# Patient Record
Sex: MALE | URBAN - METROPOLITAN AREA
[De-identification: names, ages, dates, MRNs, and addresses within clinical notes are randomized per-mention and may not be internally consistent; named-entity substitution may affect disease eponyms.]

---

## 2024-11-16 ENCOUNTER — APPOINTMENT (OUTPATIENT)
Dept: CARDIOLOGY | Facility: MEDICAL CENTER | Age: 74
DRG: 322 | End: 2024-11-16
Attending: INTERNAL MEDICINE
Payer: MEDICARE

## 2024-11-16 ENCOUNTER — HOSPITAL ENCOUNTER (OUTPATIENT)
Dept: RADIOLOGY | Facility: MEDICAL CENTER | Age: 74
End: 2024-11-16
Payer: MEDICARE

## 2024-11-16 ENCOUNTER — HOSPITAL ENCOUNTER (INPATIENT)
Facility: MEDICAL CENTER | Age: 74
LOS: 4 days | DRG: 322 | End: 2024-11-20
Attending: STUDENT IN AN ORGANIZED HEALTH CARE EDUCATION/TRAINING PROGRAM | Admitting: INTERNAL MEDICINE
Payer: MEDICARE

## 2024-11-16 ENCOUNTER — APPOINTMENT (OUTPATIENT)
Dept: RADIOLOGY | Facility: MEDICAL CENTER | Age: 74
DRG: 322 | End: 2024-11-16
Payer: MEDICARE

## 2024-11-16 DIAGNOSIS — I25.83 CORONARY ARTERY DISEASE DUE TO LIPID RICH PLAQUE: ICD-10-CM

## 2024-11-16 DIAGNOSIS — I25.10 CORONARY ARTERY DISEASE DUE TO LIPID RICH PLAQUE: ICD-10-CM

## 2024-11-16 DIAGNOSIS — I21.11 ST ELEVATION MYOCARDIAL INFARCTION INVOLVING RIGHT CORONARY ARTERY (HCC): ICD-10-CM

## 2024-11-16 DIAGNOSIS — N17.9 AKI (ACUTE KIDNEY INJURY) (HCC): ICD-10-CM

## 2024-11-16 PROBLEM — E11.9 NON-INSULIN DEPENDENT TYPE 2 DIABETES MELLITUS (HCC): Status: ACTIVE | Noted: 2024-11-16

## 2024-11-16 PROBLEM — R25.1 TREMOR: Status: ACTIVE | Noted: 2024-11-16

## 2024-11-16 PROBLEM — I10 HYPERTENSION: Status: ACTIVE | Noted: 2024-11-16

## 2024-11-16 LAB
BASOPHILS # BLD AUTO: 0.2 % (ref 0–1.8)
BASOPHILS # BLD: 0.04 K/UL (ref 0–0.12)
EKG IMPRESSION: NORMAL
EOSINOPHIL # BLD AUTO: 0.01 K/UL (ref 0–0.51)
EOSINOPHIL NFR BLD: 0.1 % (ref 0–6.9)
ERYTHROCYTE [DISTWIDTH] IN BLOOD BY AUTOMATED COUNT: 50.5 FL (ref 35.9–50)
HCT VFR BLD AUTO: 38.6 % (ref 42–52)
HGB BLD-MCNC: 12.5 G/DL (ref 14–18)
HOLDING TUBE BB 8507: NORMAL
IMM GRANULOCYTES # BLD AUTO: 0.09 K/UL (ref 0–0.11)
IMM GRANULOCYTES NFR BLD AUTO: 0.5 % (ref 0–0.9)
LYMPHOCYTES # BLD AUTO: 0.69 K/UL (ref 1–4.8)
LYMPHOCYTES NFR BLD: 4.2 % (ref 22–41)
MCH RBC QN AUTO: 31.4 PG (ref 27–33)
MCHC RBC AUTO-ENTMCNC: 32.4 G/DL (ref 32.3–36.5)
MCV RBC AUTO: 97 FL (ref 81.4–97.8)
MONOCYTES # BLD AUTO: 1.54 K/UL (ref 0–0.85)
MONOCYTES NFR BLD AUTO: 9.3 % (ref 0–13.4)
NEUTROPHILS # BLD AUTO: 14.19 K/UL (ref 1.82–7.42)
NEUTROPHILS NFR BLD: 85.7 % (ref 44–72)
NRBC # BLD AUTO: 0 K/UL
NRBC BLD-RTO: 0 /100 WBC (ref 0–0.2)
PLATELET # BLD AUTO: 261 K/UL (ref 164–446)
PMV BLD AUTO: 11.4 FL (ref 9–12.9)
RBC # BLD AUTO: 3.98 M/UL (ref 4.7–6.1)
WBC # BLD AUTO: 16.6 K/UL (ref 4.8–10.8)

## 2024-11-16 PROCEDURE — 36415 COLL VENOUS BLD VENIPUNCTURE: CPT

## 2024-11-16 PROCEDURE — 96365 THER/PROPH/DIAG IV INF INIT: CPT

## 2024-11-16 PROCEDURE — 99153 MOD SED SAME PHYS/QHP EA: CPT

## 2024-11-16 PROCEDURE — 99152 MOD SED SAME PHYS/QHP 5/>YRS: CPT | Performed by: INTERNAL MEDICINE

## 2024-11-16 PROCEDURE — 027034Z DILATION OF CORONARY ARTERY, ONE ARTERY WITH DRUG-ELUTING INTRALUMINAL DEVICE, PERCUTANEOUS APPROACH: ICD-10-PCS | Performed by: INTERNAL MEDICINE

## 2024-11-16 PROCEDURE — 700102 HCHG RX REV CODE 250 W/ 637 OVERRIDE(OP)

## 2024-11-16 PROCEDURE — 93005 ELECTROCARDIOGRAM TRACING: CPT | Performed by: INTERNAL MEDICINE

## 2024-11-16 PROCEDURE — 71045 X-RAY EXAM CHEST 1 VIEW: CPT

## 2024-11-16 PROCEDURE — 700111 HCHG RX REV CODE 636 W/ 250 OVERRIDE (IP)

## 2024-11-16 PROCEDURE — 99291 CRITICAL CARE FIRST HOUR: CPT | Mod: GC | Performed by: INTERNAL MEDICINE

## 2024-11-16 PROCEDURE — 93458 L HRT ARTERY/VENTRICLE ANGIO: CPT | Mod: 59 | Performed by: INTERNAL MEDICINE

## 2024-11-16 PROCEDURE — 770022 HCHG ROOM/CARE - ICU (200)

## 2024-11-16 PROCEDURE — 700101 HCHG RX REV CODE 250

## 2024-11-16 PROCEDURE — A9270 NON-COVERED ITEM OR SERVICE: HCPCS

## 2024-11-16 PROCEDURE — 99291 CRITICAL CARE FIRST HOUR: CPT

## 2024-11-16 PROCEDURE — 85025 COMPLETE CBC W/AUTO DIFF WBC: CPT

## 2024-11-16 PROCEDURE — 92941 PRQ TRLML REVSC TOT OCCL AMI: CPT | Performed by: INTERNAL MEDICINE

## 2024-11-16 PROCEDURE — 99222 1ST HOSP IP/OBS MODERATE 55: CPT | Performed by: INTERNAL MEDICINE

## 2024-11-16 PROCEDURE — B211YZZ FLUOROSCOPY OF MULTIPLE CORONARY ARTERIES USING OTHER CONTRAST: ICD-10-PCS | Performed by: INTERNAL MEDICINE

## 2024-11-16 PROCEDURE — 93005 ELECTROCARDIOGRAM TRACING: CPT | Performed by: STUDENT IN AN ORGANIZED HEALTH CARE EDUCATION/TRAINING PROGRAM

## 2024-11-16 PROCEDURE — 4A023N7 MEASUREMENT OF CARDIAC SAMPLING AND PRESSURE, LEFT HEART, PERCUTANEOUS APPROACH: ICD-10-PCS | Performed by: INTERNAL MEDICINE

## 2024-11-16 RX ORDER — ASPIRIN 81 MG/1
81 TABLET ORAL DAILY
Status: DISCONTINUED | OUTPATIENT
Start: 2024-11-17 | End: 2024-11-20 | Stop reason: HOSPADM

## 2024-11-16 RX ORDER — BIVALIRUDIN 250 MG/5ML
INJECTION, POWDER, LYOPHILIZED, FOR SOLUTION INTRAVENOUS
Status: COMPLETED
Start: 2024-11-16 | End: 2024-11-16

## 2024-11-16 RX ORDER — SODIUM CHLORIDE 9 MG/ML
3 INJECTION, SOLUTION INTRAVENOUS CONTINUOUS
Status: ACTIVE | OUTPATIENT
Start: 2024-11-16 | End: 2024-11-17

## 2024-11-16 RX ORDER — MIDAZOLAM HYDROCHLORIDE 1 MG/ML
INJECTION INTRAMUSCULAR; INTRAVENOUS
Status: COMPLETED
Start: 2024-11-16 | End: 2024-11-16

## 2024-11-16 RX ORDER — HEPARIN SODIUM 1000 [USP'U]/ML
INJECTION, SOLUTION INTRAVENOUS; SUBCUTANEOUS
Status: COMPLETED
Start: 2024-11-16 | End: 2024-11-16

## 2024-11-16 RX ORDER — LIDOCAINE HYDROCHLORIDE 20 MG/ML
INJECTION, SOLUTION INFILTRATION; PERINEURAL
Status: COMPLETED
Start: 2024-11-16 | End: 2024-11-16

## 2024-11-16 RX ORDER — HEPARIN SODIUM 200 [USP'U]/100ML
INJECTION, SOLUTION INTRAVENOUS
Status: COMPLETED
Start: 2024-11-16 | End: 2024-11-16

## 2024-11-16 RX ORDER — CLOPIDOGREL BISULFATE 75 MG/1
75 TABLET ORAL DAILY
Status: DISCONTINUED | OUTPATIENT
Start: 2024-11-17 | End: 2024-11-20 | Stop reason: HOSPADM

## 2024-11-16 RX ORDER — HEPARIN SODIUM 5000 [USP'U]/100ML
INJECTION, SOLUTION INTRAVENOUS
Status: COMPLETED
Start: 2024-11-16 | End: 2024-11-16

## 2024-11-16 RX ORDER — VERAPAMIL HYDROCHLORIDE 2.5 MG/ML
INJECTION, SOLUTION INTRAVENOUS
Status: COMPLETED
Start: 2024-11-16 | End: 2024-11-16

## 2024-11-16 RX ORDER — ACETAMINOPHEN 325 MG/1
650 TABLET ORAL EVERY 6 HOURS PRN
Status: DISCONTINUED | OUTPATIENT
Start: 2024-11-16 | End: 2024-11-20 | Stop reason: HOSPADM

## 2024-11-16 RX ORDER — HEPARIN SODIUM 5000 [USP'U]/100ML
0-30 INJECTION, SOLUTION INTRAVENOUS CONTINUOUS
Status: DISCONTINUED | OUTPATIENT
Start: 2024-11-16 | End: 2024-11-16

## 2024-11-16 RX ORDER — CLOPIDOGREL 300 MG/1
TABLET, FILM COATED ORAL
Status: COMPLETED
Start: 2024-11-16 | End: 2024-11-16

## 2024-11-16 RX ORDER — HEPARIN SODIUM 1000 [USP'U]/ML
30 INJECTION, SOLUTION INTRAVENOUS; SUBCUTANEOUS PRN
Status: DISCONTINUED | OUTPATIENT
Start: 2024-11-16 | End: 2024-11-16

## 2024-11-16 RX ORDER — CLOPIDOGREL BISULFATE 75 MG/1
300 TABLET ORAL ONCE
Status: DISCONTINUED | OUTPATIENT
Start: 2024-11-16 | End: 2024-11-16

## 2024-11-16 RX ADMIN — HEPARIN SODIUM 12 UNITS/KG/HR: 5000 INJECTION, SOLUTION INTRAVENOUS at 21:45

## 2024-11-16 RX ADMIN — NITROGLYCERIN 10 ML: 20 INJECTION INTRAVENOUS at 21:57

## 2024-11-16 RX ADMIN — CLOPIDOGREL BISULFATE 300 MG: 300 TABLET, FILM COATED ORAL at 22:34

## 2024-11-16 RX ADMIN — HEPARIN SODIUM: 1000 INJECTION, SOLUTION INTRAVENOUS; SUBCUTANEOUS at 21:54

## 2024-11-16 RX ADMIN — BIVALIRUDIN 1.75 MG/KG/HR: 250 INJECTION, POWDER, LYOPHILIZED, FOR SOLUTION INTRAVENOUS at 22:07

## 2024-11-16 RX ADMIN — VERAPAMIL HYDROCHLORIDE 2.5 MG: 2.5 INJECTION, SOLUTION INTRAVENOUS at 21:56

## 2024-11-16 RX ADMIN — BIVALIRUDIN 0.2 MG/KG/HR: 250 INJECTION, POWDER, LYOPHILIZED, FOR SOLUTION INTRAVENOUS at 22:40

## 2024-11-16 RX ADMIN — MIDAZOLAM HYDROCHLORIDE 1 MG: 1 INJECTION, SOLUTION INTRAMUSCULAR; INTRAVENOUS at 22:27

## 2024-11-16 RX ADMIN — LIDOCAINE HYDROCHLORIDE: 20 INJECTION, SOLUTION INFILTRATION; PERINEURAL at 21:56

## 2024-11-16 RX ADMIN — HEPARIN SODIUM 2000 UNITS: 200 INJECTION, SOLUTION INTRAVENOUS at 21:57

## 2024-11-16 RX ADMIN — FENTANYL CITRATE 50 MCG: 50 INJECTION, SOLUTION INTRAMUSCULAR; INTRAVENOUS at 22:27

## 2024-11-16 SDOH — ECONOMIC STABILITY: TRANSPORTATION INSECURITY
IN THE PAST 12 MONTHS, HAS LACK OF RELIABLE TRANSPORTATION KEPT YOU FROM MEDICAL APPOINTMENTS, MEETINGS, WORK OR FROM GETTING THINGS NEEDED FOR DAILY LIVING?: NO

## 2024-11-16 SDOH — ECONOMIC STABILITY: TRANSPORTATION INSECURITY
IN THE PAST 12 MONTHS, HAS THE LACK OF TRANSPORTATION KEPT YOU FROM MEDICAL APPOINTMENTS OR FROM GETTING MEDICATIONS?: NO

## 2024-11-16 ASSESSMENT — COGNITIVE AND FUNCTIONAL STATUS - GENERAL
SUGGESTED CMS G CODE MODIFIER DAILY ACTIVITY: CH
DAILY ACTIVITIY SCORE: 24
SUGGESTED CMS G CODE MODIFIER MOBILITY: CI
MOBILITY SCORE: 23
CLIMB 3 TO 5 STEPS WITH RAILING: A LITTLE

## 2024-11-16 ASSESSMENT — PATIENT HEALTH QUESTIONNAIRE - PHQ9
2. FEELING DOWN, DEPRESSED, IRRITABLE, OR HOPELESS: NOT AT ALL
1. LITTLE INTEREST OR PLEASURE IN DOING THINGS: NOT AT ALL
SUM OF ALL RESPONSES TO PHQ9 QUESTIONS 1 AND 2: 0

## 2024-11-16 ASSESSMENT — PAIN DESCRIPTION - PAIN TYPE: TYPE: ACUTE PAIN

## 2024-11-16 ASSESSMENT — SOCIAL DETERMINANTS OF HEALTH (SDOH)
WITHIN THE PAST 12 MONTHS, YOU WORRIED THAT YOUR FOOD WOULD RUN OUT BEFORE YOU GOT THE MONEY TO BUY MORE: SOMETIMES TRUE
IN THE PAST 12 MONTHS, HAS THE ELECTRIC, GAS, OIL, OR WATER COMPANY THREATENED TO SHUT OFF SERVICE IN YOUR HOME?: YES
WITHIN THE LAST YEAR, HAVE YOU BEEN AFRAID OF YOUR PARTNER OR EX-PARTNER?: NO
WITHIN THE LAST YEAR, HAVE TO BEEN RAPED OR FORCED TO HAVE ANY KIND OF SEXUAL ACTIVITY BY YOUR PARTNER OR EX-PARTNER?: NO
WITHIN THE LAST YEAR, HAVE YOU BEEN HUMILIATED OR EMOTIONALLY ABUSED IN OTHER WAYS BY YOUR PARTNER OR EX-PARTNER?: NO
WITHIN THE LAST YEAR, HAVE YOU BEEN KICKED, HIT, SLAPPED, OR OTHERWISE PHYSICALLY HURT BY YOUR PARTNER OR EX-PARTNER?: NO
WITHIN THE PAST 12 MONTHS, THE FOOD YOU BOUGHT JUST DIDN'T LAST AND YOU DIDN'T HAVE MONEY TO GET MORE: NEVER TRUE

## 2024-11-17 ENCOUNTER — APPOINTMENT (OUTPATIENT)
Dept: RADIOLOGY | Facility: MEDICAL CENTER | Age: 74
DRG: 322 | End: 2024-11-17
Attending: HOSPITALIST
Payer: MEDICARE

## 2024-11-17 ENCOUNTER — APPOINTMENT (OUTPATIENT)
Dept: CARDIOLOGY | Facility: MEDICAL CENTER | Age: 74
DRG: 322 | End: 2024-11-17
Attending: INTERNAL MEDICINE
Payer: MEDICARE

## 2024-11-17 PROBLEM — D72.825 BANDEMIA: Status: ACTIVE | Noted: 2024-11-17

## 2024-11-17 PROBLEM — N17.9 ACUTE KIDNEY INJURY (HCC): Status: ACTIVE | Noted: 2024-11-17

## 2024-11-17 PROBLEM — D64.9 NORMOCYTIC NORMOCHROMIC ANEMIA: Status: ACTIVE | Noted: 2024-11-17

## 2024-11-17 PROBLEM — I25.10 CORONARY ARTERY DISEASE: Status: ACTIVE | Noted: 2024-11-17

## 2024-11-17 LAB
ANION GAP SERPL CALC-SCNC: 11 MMOL/L (ref 7–16)
ANION GAP SERPL CALC-SCNC: 12 MMOL/L (ref 7–16)
APPEARANCE UR: CLEAR
BACTERIA #/AREA URNS HPF: ABNORMAL /HPF
BILIRUB UR QL STRIP.AUTO: NEGATIVE
BUN SERPL-MCNC: 35 MG/DL (ref 8–22)
BUN SERPL-MCNC: 36 MG/DL (ref 8–22)
CALCIUM SERPL-MCNC: 8.2 MG/DL (ref 8.5–10.5)
CALCIUM SERPL-MCNC: 8.2 MG/DL (ref 8.5–10.5)
CASTS URNS QL MICRO: ABNORMAL /LPF (ref 0–2)
CHLORIDE SERPL-SCNC: 106 MMOL/L (ref 96–112)
CHLORIDE SERPL-SCNC: 108 MMOL/L (ref 96–112)
CHOLEST SERPL-MCNC: 135 MG/DL (ref 100–199)
CO2 SERPL-SCNC: 19 MMOL/L (ref 20–33)
CO2 SERPL-SCNC: 19 MMOL/L (ref 20–33)
COLOR UR: YELLOW
CREAT SERPL-MCNC: 2.17 MG/DL (ref 0.5–1.4)
CREAT SERPL-MCNC: 2.23 MG/DL (ref 0.5–1.4)
EKG IMPRESSION: NORMAL
EPITHELIAL CELLS 1715: ABNORMAL /HPF (ref 0–5)
ERYTHROCYTE [DISTWIDTH] IN BLOOD BY AUTOMATED COUNT: 49.1 FL (ref 35.9–50)
EST. AVERAGE GLUCOSE BLD GHB EST-MCNC: 154 MG/DL
GFR SERPLBLD CREATININE-BSD FMLA CKD-EPI: 30 ML/MIN/1.73 M 2
GFR SERPLBLD CREATININE-BSD FMLA CKD-EPI: 31 ML/MIN/1.73 M 2
GLUCOSE BLD STRIP.AUTO-MCNC: 116 MG/DL (ref 65–99)
GLUCOSE BLD STRIP.AUTO-MCNC: 178 MG/DL (ref 65–99)
GLUCOSE BLD STRIP.AUTO-MCNC: 187 MG/DL (ref 65–99)
GLUCOSE BLD STRIP.AUTO-MCNC: 216 MG/DL (ref 65–99)
GLUCOSE SERPL-MCNC: 185 MG/DL (ref 65–99)
GLUCOSE SERPL-MCNC: 198 MG/DL (ref 65–99)
GLUCOSE UR STRIP.AUTO-MCNC: NEGATIVE MG/DL
HBA1C MFR BLD: 7 % (ref 4–5.6)
HCT VFR BLD AUTO: 36.5 % (ref 42–52)
HDLC SERPL-MCNC: 40 MG/DL
HGB BLD-MCNC: 11.7 G/DL (ref 14–18)
KETONES UR STRIP.AUTO-MCNC: NEGATIVE MG/DL
LDLC SERPL CALC-MCNC: 80 MG/DL
LEUKOCYTE ESTERASE UR QL STRIP.AUTO: ABNORMAL
LV EJECT FRACT  99904: 55
MAGNESIUM SERPL-MCNC: 2 MG/DL (ref 1.5–2.5)
MCH RBC QN AUTO: 31.1 PG (ref 27–33)
MCHC RBC AUTO-ENTMCNC: 32.1 G/DL (ref 32.3–36.5)
MCV RBC AUTO: 97.1 FL (ref 81.4–97.8)
MICRO URNS: ABNORMAL
NITRITE UR QL STRIP.AUTO: NEGATIVE
PH UR STRIP.AUTO: 5 [PH] (ref 5–8)
PHOSPHATE SERPL-MCNC: 3.9 MG/DL (ref 2.5–4.5)
PLATELET # BLD AUTO: 178 K/UL (ref 164–446)
PMV BLD AUTO: 10.1 FL (ref 9–12.9)
POTASSIUM SERPL-SCNC: 4.4 MMOL/L (ref 3.6–5.5)
POTASSIUM SERPL-SCNC: 4.4 MMOL/L (ref 3.6–5.5)
PROT UR QL STRIP: 30 MG/DL
RBC # BLD AUTO: 3.76 M/UL (ref 4.7–6.1)
RBC # URNS HPF: ABNORMAL /HPF (ref 0–2)
RBC UR QL AUTO: NEGATIVE
SODIUM SERPL-SCNC: 137 MMOL/L (ref 135–145)
SODIUM SERPL-SCNC: 138 MMOL/L (ref 135–145)
SP GR UR STRIP.AUTO: 1.03
TRIGL SERPL-MCNC: 75 MG/DL (ref 0–149)
UROBILINOGEN UR STRIP.AUTO-MCNC: 1 EU/DL
WBC # BLD AUTO: 13 K/UL (ref 4.8–10.8)
WBC #/AREA URNS HPF: ABNORMAL /HPF

## 2024-11-17 PROCEDURE — 82962 GLUCOSE BLOOD TEST: CPT

## 2024-11-17 PROCEDURE — 80061 LIPID PANEL: CPT

## 2024-11-17 PROCEDURE — 700102 HCHG RX REV CODE 250 W/ 637 OVERRIDE(OP): Performed by: INTERNAL MEDICINE

## 2024-11-17 PROCEDURE — 99233 SBSQ HOSP IP/OBS HIGH 50: CPT | Performed by: INTERNAL MEDICINE

## 2024-11-17 PROCEDURE — 93306 TTE W/DOPPLER COMPLETE: CPT | Mod: 26 | Performed by: INTERNAL MEDICINE

## 2024-11-17 PROCEDURE — 83036 HEMOGLOBIN GLYCOSYLATED A1C: CPT

## 2024-11-17 PROCEDURE — 85027 COMPLETE CBC AUTOMATED: CPT

## 2024-11-17 PROCEDURE — 83735 ASSAY OF MAGNESIUM: CPT

## 2024-11-17 PROCEDURE — 99232 SBSQ HOSP IP/OBS MODERATE 35: CPT | Performed by: INTERNAL MEDICINE

## 2024-11-17 PROCEDURE — A9270 NON-COVERED ITEM OR SERVICE: HCPCS | Performed by: INTERNAL MEDICINE

## 2024-11-17 PROCEDURE — 99223 1ST HOSP IP/OBS HIGH 75: CPT | Performed by: HOSPITALIST

## 2024-11-17 PROCEDURE — 80048 BASIC METABOLIC PNL TOTAL CA: CPT

## 2024-11-17 PROCEDURE — 770000 HCHG ROOM/CARE - INTERMEDIATE ICU *

## 2024-11-17 PROCEDURE — 84100 ASSAY OF PHOSPHORUS: CPT

## 2024-11-17 PROCEDURE — 93306 TTE W/DOPPLER COMPLETE: CPT

## 2024-11-17 PROCEDURE — 81001 URINALYSIS AUTO W/SCOPE: CPT

## 2024-11-17 PROCEDURE — 76775 US EXAM ABDO BACK WALL LIM: CPT

## 2024-11-17 PROCEDURE — 700105 HCHG RX REV CODE 258: Performed by: INTERNAL MEDICINE

## 2024-11-17 RX ORDER — DEXTROSE MONOHYDRATE 25 G/50ML
25 INJECTION, SOLUTION INTRAVENOUS
Status: DISCONTINUED | OUTPATIENT
Start: 2024-11-17 | End: 2024-11-17

## 2024-11-17 RX ORDER — ATORVASTATIN CALCIUM 40 MG/1
40 TABLET, FILM COATED ORAL EVERY EVENING
Status: DISCONTINUED | OUTPATIENT
Start: 2024-11-17 | End: 2024-11-19

## 2024-11-17 RX ORDER — DEXTROSE MONOHYDRATE 25 G/50ML
25 INJECTION, SOLUTION INTRAVENOUS
Status: DISCONTINUED | OUTPATIENT
Start: 2024-11-17 | End: 2024-11-20 | Stop reason: HOSPADM

## 2024-11-17 RX ORDER — INSULIN LISPRO 100 [IU]/ML
2-9 INJECTION, SOLUTION INTRAVENOUS; SUBCUTANEOUS
Status: DISCONTINUED | OUTPATIENT
Start: 2024-11-17 | End: 2024-11-20 | Stop reason: HOSPADM

## 2024-11-17 RX ORDER — INSULIN LISPRO 100 [IU]/ML
1-6 INJECTION, SOLUTION INTRAVENOUS; SUBCUTANEOUS
Status: DISCONTINUED | OUTPATIENT
Start: 2024-11-17 | End: 2024-11-17

## 2024-11-17 RX ORDER — METOPROLOL TARTRATE 25 MG/1
25 TABLET, FILM COATED ORAL 2 TIMES DAILY
Status: DISCONTINUED | OUTPATIENT
Start: 2024-11-17 | End: 2024-11-17

## 2024-11-17 RX ORDER — METOPROLOL TARTRATE 25 MG/1
12.5 TABLET, FILM COATED ORAL 2 TIMES DAILY
Status: DISCONTINUED | OUTPATIENT
Start: 2024-11-17 | End: 2024-11-18

## 2024-11-17 RX ADMIN — INSULIN LISPRO 2 UNITS: 100 INJECTION, SOLUTION INTRAVENOUS; SUBCUTANEOUS at 20:45

## 2024-11-17 RX ADMIN — CLOPIDOGREL BISULFATE 75 MG: 75 TABLET ORAL at 05:05

## 2024-11-17 RX ADMIN — ASPIRIN 81 MG: 81 TABLET, COATED ORAL at 05:04

## 2024-11-17 RX ADMIN — SODIUM CHLORIDE 3 ML/KG/HR: 9 INJECTION, SOLUTION INTRAVENOUS at 02:42

## 2024-11-17 RX ADMIN — ATORVASTATIN CALCIUM 40 MG: 40 TABLET, FILM COATED ORAL at 17:24

## 2024-11-17 RX ADMIN — ACETAMINOPHEN 650 MG: 325 TABLET ORAL at 18:33

## 2024-11-17 RX ADMIN — METOPROLOL TARTRATE 12.5 MG: 25 TABLET, FILM COATED ORAL at 17:24

## 2024-11-17 RX ADMIN — INSULIN LISPRO 3 UNITS: 100 INJECTION, SOLUTION INTRAVENOUS; SUBCUTANEOUS at 12:37

## 2024-11-17 ASSESSMENT — ENCOUNTER SYMPTOMS
EYE REDNESS: 0
BACK PAIN: 0
CHILLS: 0
NAUSEA: 0
HEARTBURN: 0
FLANK PAIN: 0
ORTHOPNEA: 0
BLURRED VISION: 0
WHEEZING: 0
FOCAL WEAKNESS: 0
CONSTITUTIONAL NEGATIVE: 1
PALPITATIONS: 0
NERVOUS/ANXIOUS: 0
MYALGIAS: 0
TREMORS: 1
TINGLING: 0
SEIZURES: 0
PHOTOPHOBIA: 0
MUSCULOSKELETAL NEGATIVE: 1
SINUS PAIN: 0
SORE THROAT: 0
PSYCHIATRIC NEGATIVE: 1
NECK PAIN: 0
SHORTNESS OF BREATH: 0
EYE DISCHARGE: 0
ABDOMINAL PAIN: 0
EYE PAIN: 0
DIZZINESS: 0
DEPRESSION: 0
COUGH: 1
RESPIRATORY NEGATIVE: 1
DOUBLE VISION: 0
VOMITING: 0
SPUTUM PRODUCTION: 0
DIAPHORESIS: 0
CLAUDICATION: 0
HEMOPTYSIS: 0
HALLUCINATIONS: 0
EYES NEGATIVE: 1
NEUROLOGICAL NEGATIVE: 1
PND: 0
STRIDOR: 0
DIARRHEA: 0
LOSS OF CONSCIOUSNESS: 0
BRUISES/BLEEDS EASILY: 0
COUGH: 0
HEADACHES: 0
GASTROINTESTINAL NEGATIVE: 1
WEIGHT LOSS: 0
FEVER: 0

## 2024-11-17 ASSESSMENT — LIFESTYLE VARIABLES
HOW MANY TIMES IN THE PAST YEAR HAVE YOU HAD 5 OR MORE DRINKS IN A DAY: 0
TOTAL SCORE: 0
HAVE YOU EVER FELT YOU SHOULD CUT DOWN ON YOUR DRINKING: NO
AVERAGE NUMBER OF DAYS PER WEEK YOU HAVE A DRINK CONTAINING ALCOHOL: 0
CONSUMPTION TOTAL: NEGATIVE
TOTAL SCORE: 0
HAVE PEOPLE ANNOYED YOU BY CRITICIZING YOUR DRINKING: NO
ON A TYPICAL DAY WHEN YOU DRINK ALCOHOL HOW MANY DRINKS DO YOU HAVE: 0
DOES PATIENT WANT TO STOP DRINKING: NO
ALCOHOL_USE: NO
TOTAL SCORE: 0
EVER FELT BAD OR GUILTY ABOUT YOUR DRINKING: NO
EVER HAD A DRINK FIRST THING IN THE MORNING TO STEADY YOUR NERVES TO GET RID OF A HANGOVER: NO

## 2024-11-17 ASSESSMENT — PATIENT HEALTH QUESTIONNAIRE - PHQ9
1. LITTLE INTEREST OR PLEASURE IN DOING THINGS: NOT AT ALL
SUM OF ALL RESPONSES TO PHQ9 QUESTIONS 1 AND 2: 0
2. FEELING DOWN, DEPRESSED, IRRITABLE, OR HOPELESS: NOT AT ALL

## 2024-11-17 ASSESSMENT — PAIN DESCRIPTION - PAIN TYPE
TYPE: ACUTE PAIN

## 2024-11-17 NOTE — DISCHARGE INSTR - DIET
Heart-Healthy Nutrition Therapy    A heart-healthy diet is recommended to reduce your unhealthy blood cholesterol levels, manage high blood pressure, and lower your risk for heart disease.    To follow a heart-healthy diet,    Eat a balanced diet with whole grains, fruits and vegetables, and lean protein sources.  Achieve and maintain a healthy weight.  Choose heart-healthy unsaturated fats. Limit saturated fats, trans fats, and cholesterol intake. Eat more plant-based or vegetarian meals using beans and soy foods for protein.  Eat whole, unprocessed foods to limit the amount of sodium (salt) you eat.  Limit refined carbohydrates especially sugar, sweets and sugar-sweetened beverages.  If you drink alcohol, do so in moderation: one serving per day (women) and two servings per day (men).  One serving is equivalent to 12 ounces beer, 5 ounces wine, or 1.5 ounces distilled spirits    Tips for Choosing Heart-Healthy Fats    Choose lean protein and low-fat dairy foods to reduce saturated fat intake.    Saturated fat is usually found in animal-based protein and is associated with certain health risks. Saturated fat is the biggest contributor to raised low-density lipoprotein (LDL) cholesterol levels in the diet. Research shows that limiting saturated fat lowers unhealthy cholesterol levels. Eat no more than 5-6% of your total calories each day from saturated fat. Ask your registered dietitian nutritionist (RDN) to help you determine how much saturated fat is right for you.  There are many foods that do not contain large amounts of saturated fats. Swapping these foods to replace foods high in saturated fats will help you limit the saturated fat you eat and improve your cholesterol levels. You can also try eating more plant-based or vegetarian meals.        Avoid trans fats    Trans fats increase levels of LDL-cholesterol. Hydrogenated fat in processed foods is the main source of trans fats in foods.   Trans fats can be  found in stick margarine, shortening, processed sweets, baked goods, some fried foods, and packaged foods made with hydrogenated oils. Avoid foods with “partially hydrogenated oil” on the ingredient list such as: cookies, pastries, baked goods, biscuits, crackers, microwave popcorn, and frozen dinners.    Choose foods with heart healthy fats    Polyunsaturated and monounsaturated fat are unsaturated fats that may help lower your blood cholesterol level when used in place of saturated fat in your diet.  Ask your RDN about taking a dietary supplement with plant sterols and stanols to help lower your cholesterol level.  Research shows that substituting saturated fats with unsaturated fats is beneficial to cholesterol levels. Try these easy swaps:      Limit the amount of cholesterol you eat to less than 200 milligrams per day.    Cholesterol is a substance carried through the bloodstream via lipoproteins, which are known as “transporters” of fat. Some body functions need cholesterol to work properly, but too much cholesterol in the bloodstream can damage arteries and build up blood vessel linings (which can lead to heart attack and stroke). You should eat less than 200 milligrams cholesterol per day.  People respond differently to eating cholesterol. There is no test available right now that can figure out which people will respond more to dietary cholesterol and which will respond less. For individuals with high intake of dietary cholesterol, different types of increase (none, small, moderate, large) in LDL-cholesterol levels are all possible.    Food sources of cholesterol include egg yolks and organ meats such as liver, gizzards.  Limit egg yolks to two to four per week and avoid organ meats like liver and gizzards to control cholesterol intake.    Tips for Choosing Heart-Healthy Carbohydrates    Consume foods rich in viscous (soluble) fiber    Viscous, or soluble, fiber is found in the walls of plant cells. Viscous  fiber is found only in plant-based foods--animal-based foods like meat or dairy products do not contain fiber. In the stomach, viscous fibers absorb water and swell to form a thick, jelly-like mass. This helps to lower your unhealthy cholesterol  Rich sources of viscous fiber include asparagus, [place] sprouts, sweet potatoes, turnips, apricots, mangoes, oranges, legumes, barley, oats, and oat bran.  Eat at least 5 to 10 grams of viscous fiber each day. As you increase your fiber intake gradually, also increase the amount of water you drink. This will help prevent constipation.  If you have difficulty achieving this goal, ask your RDN about fiber laxatives. Choose fiber supplements made with viscous fibers such as psyllium seed husks or methylcellulose to help lower unhealthy cholesterol.    Limit refined carbohydrates    There are three types of carbohydrates: starches, sugar, and fiber. Some carbohydrates occur naturally in food, like the starches in rice or corn or the sugars in fruits and milk. Refined carbohydrates--foods with high amounts of simple sugars--can raise triglyceride levels. High triglyceride levels are associated with coronary heart disease.  Some examples of refined carbohydrate foods are table sugar, sweets, and beverages sweetened with added sugar.    Tips for Reducing Sodium (Salt)  Although sodium is important for your body to function, too much sodium can be harmful for people with high blood pressure.  As sodium and fluid buildup in your tissues and bloodstream, your blood pressure increases. High blood pressure may cause damage to other organs and increase your risk for a stroke.    Keep your salt intake to 2300 milligrams or less per day. Even if you take a pill for blood pressure or a water pill (diuretic) to remove fluid, it is still important to have less salt in your diet. Ask your RDN what amount of sodium is right for you.    Avoid processed foods.  Eat more fresh foods.  Fresh  "fruits and vegetables are naturally low in sodium, as well as frozen vegetables and fruits that have no added juices or sauces.  Fresh meats are lower in sodium than processed meats, such as de leon, sausage, and hotdogs.  Read the nutrition label or ask your  to help you find a fresh meat that is low in sodium.  Eat less salt--at the table and when cooking.  A single teaspoon of table salt has 2,300 mg of sodium.  Leave the salt out of recipes for pasta, casseroles, and soups.  Ask your RDN how to cook your favorite recipes without sodium  Be a smart .  Look for food packages that say “salt-free” or “sodium-free.” These items contain less than 5 milligrams of sodium per serving.  “Very low-sodium” products contain less than 35 milligrams of sodium per serving.  “Low-sodium” products contain less than 140 milligrams of sodium per serving.   Beware of “reduced salt” or \"reduced sodium\" products.  These items may still be high in sodium. Check the nutrition label.   Add flavors to your food without adding sodium.  Try lemon juice, lime juice, fruit juice or vinegar.    Dry or fresh herbs add flavor. Try basil, bay leaf, dill, rosemary, parsley, sierra, dry mustard, nutmeg, thyme, and paprika.  Pepper, red pepper flakes, and cayenne pepper can add spice to your meals without adding sodium. Hot sauce contains sodium, but if you use just a drop or two, it will not add up to much.  Buy a sodium-free seasoning blend or make your own at home.     Additional Lifestyle Tips    Achieve and maintain a healthy weight.  Talk with your RDN or your doctor about what is a healthy weight for you.  Set goals to reach and maintain that weight.   To lose weight, reduce your calorie intake along with increasing your physical activity. A weight loss of 10 to 15 pounds could reduce LDL-cholesterol by 5 milligrams per deciliter.  Participate in physical activity.    Talk with your health care team to find out what types of " physical activity are best for you. Set a plan to get about 30 minutes of exercise on most days.          Nutrition Counseling  Our expert team offers:   Medical Nutrition Therapy for Chronic Conditions   Weight Management   Diabetes Education and Management   Wellness Services   Body Composition Measurements   Gastrointestinal Health    N[facility] Services are located at:  [address], [place] [address]  For more information and to schedule a consultation, please call [phone].  A physician referral may be required by your insurance for coverage.

## 2024-11-17 NOTE — PROGRESS NOTES
Critical Care Progress Note    Date of admission  11/16/2024    Chief Complaint  74 y.o. male admitted 11/16/2024 with an acute inferior STEMI.  He has a history of DM type II, primary hypertension and dyslipidemia.    Hospital Course      11/17 -    doing well after PCI.  Continue aspirin and clopidogrel.  Begin atorvastatin.  Continue neuro checks as he received tenecteplase.      Interval Problem Update  Reviewed last 24 hour events:      SR  99.5  +899 mL in the last 24      Rancho is feeling better today.  His chest pain is almost completely resolved.  He is breathing better.  He has no palpitations or syncope.  He has a chronic cough.  It is not acutely worse.  He denies any dyspnea or hemoptysis.  He has no abdominal pain, nausea or vomiting.      Review of Systems  Review of Systems   Constitutional:  Negative for chills, diaphoresis and fever.   HENT:  Negative for ear discharge, ear pain, nosebleeds, sinus pain and sore throat.    Eyes:  Negative for blurred vision, double vision, photophobia, pain, discharge and redness.   Respiratory:  Positive for cough. Negative for hemoptysis, shortness of breath, wheezing and stridor.    Cardiovascular:  Negative for palpitations, claudication and PND.   Gastrointestinal:  Negative for abdominal pain, diarrhea, nausea and vomiting.   Genitourinary:  Negative for dysuria, flank pain, hematuria and urgency.   Musculoskeletal:  Negative for myalgias and neck pain.   Skin:  Negative for rash.   Neurological:  Negative for focal weakness, seizures and headaches.   Endo/Heme/Allergies:  Does not bruise/bleed easily.   Psychiatric/Behavioral:  Negative for hallucinations. The patient is not nervous/anxious.         Vital Signs for last 24 hours   Temp:  [37.1 °C (98.8 °F)-37.5 °C (99.5 °F)] 37.1 °C (98.8 °F)  Pulse:  [63-73] 66  Resp:  [20-31] 23  BP: ()/() 107/58  SpO2:  [90 %-97 %] 90 %    Hemodynamic parameters for last 24 hours       Respiratory Information  for the last 24 hours       Physical Exam   Physical Exam  Constitutional:       General: He is not in acute distress.     Appearance: He is not toxic-appearing or diaphoretic.   HENT:      Head: Normocephalic and atraumatic.      Right Ear: External ear normal.      Left Ear: External ear normal.      Nose: Nose normal.      Mouth/Throat:      Mouth: Mucous membranes are moist.   Eyes:      General: No scleral icterus.     Pupils: Pupils are equal, round, and reactive to light.   Cardiovascular:      Comments: Sinus rhythm with frequent PVCs  Pulmonary:      Effort: Pulmonary effort is normal. No respiratory distress.      Breath sounds: No stridor. No wheezing.   Abdominal:      General: Bowel sounds are normal. There is no distension.      Tenderness: There is no abdominal tenderness. There is no guarding or rebound.   Musculoskeletal:      Cervical back: Normal range of motion and neck supple. No rigidity.      Right lower leg: No edema.      Left lower leg: No edema.   Skin:     General: Skin is warm.      Capillary Refill: Capillary refill takes less than 2 seconds.   Neurological:      General: No focal deficit present.      Mental Status: He is alert and oriented to person, place, and time.      Cranial Nerves: No cranial nerve deficit.   Psychiatric:         Mood and Affect: Mood normal.         Behavior: Behavior normal.         Thought Content: Thought content normal.         Judgment: Judgment normal.         Medications  Current Facility-Administered Medications   Medication Dose Route Frequency Provider Last Rate Last Admin    MD Alert...ICU Electrolyte Replacement per Pharmacy   Other PHARMACY TO DOSE Landon Bhatia M.D.        insulin lispro (HumaLOG,AdmeLOG) subcutaneous injection  2-9 Units Subcutaneous 4X/DAY ACHS Landon Bhatia M.D.        And    dextrose 50% (D50W) injection 25 g  25 g Intravenous Q15 MIN PRN Landon Bhatia M.D.        atorvastatin (Lipitor) tablet 40 mg   40 mg Oral Q EVENING Landon Bhatia M.D.        acetaminophen (Tylenol) tablet 650 mg  650 mg Oral Q6HRS PRN Chintan Martino M.D.        aspirin EC tablet 81 mg  81 mg Oral DAILY Chintan Martino M.D.   81 mg at 11/17/24 0504    clopidogrel (Plavix) tablet 75 mg  75 mg Oral DAILY Chintan Martino M.D.   75 mg at 11/17/24 0505       Fluids    Intake/Output Summary (Last 24 hours) at 11/17/2024 0746  Last data filed at 11/17/2024 0500  Gross per 24 hour   Intake 1249.43 ml   Output 350 ml   Net 899.43 ml       Laboratory          Recent Labs     11/17/24 0130 11/17/24  0517   SODIUM 138 137   POTASSIUM 4.4 4.4   CHLORIDE 108 106   CO2 19* 19*   BUN 35* 36*   CREATININE 2.23* 2.17*   MAGNESIUM  --  2.0   PHOSPHORUS  --  3.9   CALCIUM 8.2* 8.2*     Recent Labs     11/17/24 0130 11/17/24  0517   GLUCOSE 185* 198*     Recent Labs     11/16/24 2117 11/17/24  0130   WBC 16.6* 13.0*   NEUTSPOLYS 85.70*  --    LYMPHOCYTES 4.20*  --    MONOCYTES 9.30  --    EOSINOPHILS 0.10  --    BASOPHILS 0.20  --      Recent Labs     11/16/24 2117 11/17/24  0130   RBC 3.98* 3.76*   HEMOGLOBIN 12.5* 11.7*   HEMATOCRIT 38.6* 36.5*   PLATELETCT 261 178       Imaging  X-Ray:  I have personally reviewed the images and compared with prior images. and My impression is: The lungs are clear    Assessment/Plan  * ST elevation myocardial infarction involving right coronary artery (HCC)  Assessment & Plan  Acute inferior STEMI  S/P tenecteplase at outside hospital prior to presentation - continue neuro checks per protocol  S/P emergent PCI with AMARA to the RCA on 11/16  Continue aspirin and clopidogrel  Begin atorvastatin  Start beta blocker per cardiology    Coronary artery disease  Assessment & Plan  Multivessel CAD with 70% focal mid LAD stenosis, 70 to 80% focal proximal LCx stenosis  Planned staged PCI after dismissal  Continue aspirin and clopidogrel  Begin atorvastatin  Start beta blocker per cardiology    Acute kidney injury  (HCC)  Assessment & Plan  Query underlying CKD  Check urinalysis  Monitor renal function and urine output  Avoid nephrotoxins and renal dose medications    Type 2 diabetes mellitus (HCC)  Assessment & Plan  Glycohemoglobin 7.0  Sliding scale insulin    Primary hypertension  Assessment & Plan  Goal SBP less than 160         VTE:  Contraindicated  Ulcer: Not Indicated  Lines: None    I have performed a physical exam and reviewed and updated ROS and Plan today (11/17/2024). In review of yesterday's note (11/16/2024), there are no changes except as documented above.     Discussed patient condition and risk of morbidity and/or mortality with RN, RT, and Pharmacy    Landon Bhatia MD  Pulmonary and Critical Care Medicine

## 2024-11-17 NOTE — PROGRESS NOTES
New orders of sliding scale insulin received. Checked  at 0800 am. Per MD, okay to wait to give insulin.

## 2024-11-17 NOTE — CONSULTS
Cardiology Initial Consultation    Date of Service  11/16/2024    Referring Physician  Landon Kapoor M.D.    Reason for Consultation  Inferior wall MI status post TNK with ongoing chest pain    History of Presenting Illness  Rancho He is a 74 y.o. male with a past medical history of hypertension, diabetes, hyperlipidemia who presented 11/16/2024 with inferior wall MI.  Treated with lytics placed on nitroglycerin and heparin.  Ongoing chest pain.  No previous history of MI.  Single has 1 child.  Lives in [place].      Review of Systems  Review of Systems    Past Medical History   has no past medical history on file.    Surgical History   has no past surgical history on file.    Family History  family history is not on file.    Social History       Medications  None       Allergies  No Known Allergies    Vital signs in last 24 hours  Pulse:  [73] 73  BP: (139)/(78) 139/78  SpO2:  [93 %] 93 %    Physical Exam  Physical Exam  Vitals reviewed.   Constitutional:       General: He is not in acute distress.     Appearance: He is well-developed. He is not diaphoretic.   Eyes:      Conjunctiva/sclera: Conjunctivae normal.   Neck:      Thyroid: No thyroid mass or thyromegaly.      Vascular: No JVD.      Trachea: No tracheal deviation.   Cardiovascular:      Rate and Rhythm: Normal rate and regular rhythm.      Heart sounds: No murmur heard.  Pulmonary:      Effort: Pulmonary effort is normal. No respiratory distress.      Breath sounds: Normal breath sounds.   Chest:      Chest wall: No tenderness.   Abdominal:      Palpations: Abdomen is soft.      Tenderness: There is no abdominal tenderness.   Musculoskeletal:         General: Normal range of motion.   Skin:     General: Skin is warm and dry.   Neurological:      Mental Status: He is alert and oriented to person, place, and time.      Motor: No tremor.   Psychiatric:         Behavior: Behavior normal.         Lab Review  Lab Results   Component Value Date/Time     "WBC 16.6 (H) 11/16/2024 09:17 PM    RBC 3.98 (L) 11/16/2024 09:17 PM    HEMOGLOBIN 12.5 (L) 11/16/2024 09:17 PM    HEMATOCRIT 38.6 (L) 11/16/2024 09:17 PM    MCV 97.0 11/16/2024 09:17 PM    MCH 31.4 11/16/2024 09:17 PM    MCHC 32.4 11/16/2024 09:17 PM    MPV 11.4 11/16/2024 09:17 PM      No results found for: \"SODIUM\", \"POTASSIUM\", \"CHLORIDE\", \"CO2\", \"GLUCOSE\", \"BUN\", \"CREATININE\", \"BUNCREATRAT\", \"GLOMRATE\"   No results found for: \"ASTSGOT\", \"ALTSGPT\"  No results found for: \"CHOLSTRLTOT\", \"LDL\", \"HDL\", \"TRIGLYCERIDE\", \"TROPONINT\"    No results for input(s): \"NTPROBNP\" in the last 72 hours.    Cardiac Imaging and Procedures Review  EKG:  My personal interpretation of the EKG dated 11/16/2024 is ST elevation inferiorly.  Repeat EKG here with significant improvement.    Echocardiogram: Pending    Cardiac Catheterization: Pending    Imaging  Chest X-Ray: NAD      Assessment/Plan  No new Assessment & Plan notes have been filed under this hospital service since the last note was generated.  Service: Cardiac Electrophysiology  1.  Acute inferior wall MI treated with lytics.  Currently on heparin and nitroglycerin with some ongoing chest pain not as severe.  Plan is for coronary angiography.  2.  Hypertension.  3.  Diabetes mellitus.      Thank you for allowing me to participate in the care of this patient.        Please contact me with any questions.    Landon Kapoor M.D.   Cardiologist, [facility]  [phone]          "

## 2024-11-17 NOTE — CARE PLAN
The patient is Stable - Low risk of patient condition declining or worsening    Shift Goals  Clinical Goals: Q1 neuro checks/Q12 NIH, trend labs  Patient Goals: Rest  Family Goals: No family at bedside    Progress made toward(s) clinical / shift goals:    Problem: Hemodynamic Status  Goal: Stable Vital Signs and Fluid Balance  Outcome: Progressing     Problem: Stemi  Goal: Optimal Care of the Stemi Patient  Outcome: Progressing    Problem: Pain - Standard  Goal: Alleviation of pain or a reduction in pain to the patient’s comfort goal  Description: Target End Date:  Prior to discharge or change in level of care    Document on Vitals flowsheet    1.  Document pain using the appropriate pain scale per order or unit policy  2.  Educate and implement non-pharmacologic comfort measures (i.e. relaxation, distraction, massage, cold/heat therapy, etc.)  3.  Pain management medications as ordered  4.  Reassess pain after pain med administration per policy  5.  If opiods administered assess patient's response to pain medication is appropriate per POSS sedation scale  6.  Follow pain management plan developed in collaboration with patient and interdisciplinary team (including palliative care or pain specialists if applicable)  Outcome: Progressing          Patient is not progressing towards the following goals:

## 2024-11-17 NOTE — ASSESSMENT & PLAN NOTE
Multivessel CAD with 70% focal mid LAD stenosis, 70 to 80% focal proximal LCx stenosis  Planned staged PCI after dismissal  Continue aspirin and clopidogrel  Begin atorvastatin  Start beta blocker per cardiology

## 2024-11-17 NOTE — ASSESSMENT & PLAN NOTE
Acute inferior STEMI  S/P tenecteplase at outside hospital prior to presentation - continue neuro checks per protocol  S/P emergent PCI with AMARA to the RCA on 11/16  Continue aspirin and clopidogrel  Begin atorvastatin  Start beta blocker per cardiology

## 2024-11-17 NOTE — ASSESSMENT & PLAN NOTE
No prior records.  No signs of active bleeding.  Thin patient with BMI 22, nutritional? No history of recent alcohol use.  Monitor

## 2024-11-17 NOTE — PROCEDURES
Cardiac Catheterization report    11/16/2024  10:26 PM    REFERRING MD: Dr. Landon Kapoor    INDICATION/PREOPERATIVE DIAGNOSIS:  Inferior STEMI status post failed TNK  Tremor  Hypertension  Diabetes mellitus    POSTOPERATIVE DIAGNOSIS:  99% thrombotic culprit proximal and mid RCA stenosis, JUNIOR I flow  70% focal mid LAD stenosis  70 to 80% focal proximal LCx stenosis  LVEF 75%, LVEDP 12 mmHg  Successful emergency PCI culprit proximal and mid RCA (3.25 x 48 mm Synergy AMARA), excellent result    RECOMMENDATIONS:  Guideline directed medical therapy   Cardiovascular Risk factor modification  Dual antiplatelet therapy for minimum 1 year  Outpatient staged PCI to LAD and circumflex in 6 to 12 weeks versus consideration for interval CABG. Given his low syntax score and preserved EF despite diabetes he would derive limited excessive benefit from CABG versus staged PCI.  Expressly recommend against inpatient staged intervention.      PROCEDURES PERFORMED:  Coronary arteriograms  Left heart catheterization and Left ventriculogram   Supervision moderate sedation  Percutaneous coronary intervention      FINDINGS:  I.  HEMODYNAMICS   Ao: 91/50 mmHg   LEDP: 12 mmHg   Gradient on LV pullback: No    II. CORONARY ANGIOGRAPHY:  Left main coronary artery: Large bifurcating short no CAD  Left anterior descending artery: Moderate to large caliber tortuous with a focal 70% concentric midportion mildly calcified stenosis.  Usual complement diagonals.  Left circumflex coronary artery: Moderate caliber nondominant with a mildly calcific focal 70 to 80% proximal stenosis and no other significant epicardial stenosis in this territory.  Right coronary artery: Moderate caliber dominant supplying the RPDA.  There is a thrombotic ulcerated acute plaque rupture stenosis of the proximal and mid RCA with JUNIOR I flow despite recent thrombolytic administration.  Post PCI there is a percent residual stenosis and JUNIOR-3 flow.    III.  LEFT  "VENTRICULOGRAM:  LVEF VANN PROJECTION: 75%      Procedure details:  The risks and benefits of cardiac catheterization and possible intervention were explained to the patient including death, heart attack, stroke, and emergency surgery.  The patient was brought to the cardiac catheterization lab where the right wrist was prepped and draped in the usual manner for cardiac catheterization.  The area was anesthetized with lidocaine and a 5/6 FR sheath was inserted into the right radial artery without difficulty. A #3.5 left Jonathan catheter was advanced to the ostia of the Left coronary artery and arteriograms were recorded.   A #4 right Jonathan catheter was advanced to the ostia of the right coronary artery and arteriograms were recorded. Aortic valve was crossed using #4 right Jonathan catheter for left heart catheterization was performed.     Description of PCI:  The decision was made to intervene on the culprit coronary artery.  Anticoagulation was initiated as below.  The diagnostic catheter was exchanged over a wire for an 6 Anguillan JR4 guide seated appropriately. A 0.014\" mm  Runthrough was advanced into the artery and use to cross the lesion. A 3.0 balloon was used to predilate the lesion. A 3.0 x 48 mm Synergy AMARA was then positioned and deployed at nominal pressure.  Copious intracoronary nitroglycerin and adenosine were administered for slow reflow post stenting with resolution to JUNIOR-3 flow.  Following this a 3.25 mm noncompliant balloon to high-pressure was used to post dilate the stent. There was an excellent angiographic result with JUNIOR-3 flow and no residual stenosis in the stented segment. All catheters and guidewires were removed.    At the completion of the case the sheath was removed and hemostasis achieved utilizing a radial compression band patient left Cath Lab in stable condition and there were no apparent complications.    Anticoagulant: Angiomax  Antiplatelet: Plavix (clopidogrel)  EBL <25 " cc  Complications: none  Specimens: none  Contrast: 80 cc    Complications:  None apparent    Sedation time:  Moderate sedation directly monitored by me during the case while supervising the administration of the sedation medication by an independent trained RN to assist in the monitoring of the patient's level of consciousness and physiological status. I, the supervising physician was present the entire time from beginning of medication administration until the end of the procedure from 2157 until 2221. For detailed administration records please see the moderate sedation documentation in the media tab.    Following the procedure I discussed the results with the patient and there were no family members in attendance or individuals to call.  He indicated no one else should be notified at this time.    Chintan Martino MD, FACC, [facility]

## 2024-11-17 NOTE — ASSESSMENT & PLAN NOTE
Unknown baseline creat  Improved s/p revascularization  ?cardiorenalsyndrome vs undiagnosed CKD: will need follow up  Daily BMP  Good UOP  Renally dose medications as appropriate  Renal US neg for hydro  Likely chronic

## 2024-11-17 NOTE — ED PROVIDER NOTES
"ER Provider Note    Scribed for Toan Toribio D.O. by Luci Mason. 11/16/2024  9:31 PM    Primary Care Provider: No primary care provider noted.     CHIEF COMPLAINT   STEMI    EXTERNAL RECORDS REVIEWED  External ED Note patient seen for chest pain inferior STEMI identified on EKG given TNK, Plavix, aspirin started on heparin infusion after bolus    HPI/ROS  LIMITATION TO HISTORY   Select: : None  OUTSIDE HISTORIAN(S):  EMS      Rancho He is a 74 y.o. male presenting to the ED as a STEMI transfer. Patient states that his chest pain started this morning at 8:00 AM. En route patient was given TNK, At this time he states that he continues to have the same chest pain as earlier today. Patient is a former smoke and is not on oxygen at baseline. Denies history of heart attack, stroke, GI bleeds, blood clots, diabetes, or any cardiac disease.     PAST MEDICAL HISTORY  No past medical history noted.    SURGICAL HISTORY  No past surgical history noted.    FAMILY HISTORY  No family history noted.    SOCIAL HISTORY   No social history noted     CURRENT MEDICATIONS  Previous Medications    No medications noted.     ALLERGIES  Patient has no known allergies noted.    PHYSICAL EXAM  /78   Pulse 73   Ht 1.803 m (5' 11\")   Wt 66 kg (145 lb 8.1 oz)   SpO2 93%   BMI 20.29 kg/m²     Constitutional: Alert in no apparent distress.  HENT: No signs of trauma, Bilateral external ears normal, Nose normal.   Eyes: Pupils are equal and reactive, Conjunctiva normal, Non-icteric.   Neck: Normal range of motion, No tenderness, Supple, No stridor.   Cardiovascular: Regular rate and rhythm, no murmurs.   Thorax & Lungs: Normal breath sounds, No respiratory distress, No wheezing, No chest tenderness.   Abdomen: Bowel sounds normal, Soft, No tenderness, No masses, No pulsatile masses.   Skin: Warm, Dry, No erythema, No rash.   Back: No bony tenderness, No CVA tenderness.   Extremities: Intact distal pulses, No edema, No " tenderness, No cyanosis  Musculoskeletal: Good range of motion in all major joints. No tenderness to palpation or major deformities noted.   Neurologic: Alert , Normal motor function, Normal sensory function, No focal deficits noted.     DIAGNOSTIC STUDIES    EKG/LABS  Results for orders placed or performed during the hospital encounter of 24   CBC With Differential    Collection Time: 24  9:17 PM   Result Value Ref Range    WBC 16.6 (H) 4.8 - 10.8 K/uL    RBC 3.98 (L) 4.70 - 6.10 M/uL    Hemoglobin 12.5 (L) 14.0 - 18.0 g/dL    Hematocrit 38.6 (L) 42.0 - 52.0 %    MCV 97.0 81.4 - 97.8 fL    MCH 31.4 27.0 - 33.0 pg    MCHC 32.4 32.3 - 36.5 g/dL    RDW 50.5 (H) 35.9 - 50.0 fL    Platelet Count 261 164 - 446 K/uL    MPV 11.4 9.0 - 12.9 fL    Neutrophils-Polys 85.70 (H) 44.00 - 72.00 %    Lymphocytes 4.20 (L) 22.00 - 41.00 %    Monocytes 9.30 0.00 - 13.40 %    Eosinophils 0.10 0.00 - 6.90 %    Basophils 0.20 0.00 - 1.80 %    Immature Granulocytes 0.50 0.00 - 0.90 %    Nucleated RBC 0.00 0.00 - 0.20 /100 WBC    Neutrophils (Absolute) 14.19 (H) 1.82 - 7.42 K/uL    Lymphs (Absolute) 0.69 (L) 1.00 - 4.80 K/uL    Monos (Absolute) 1.54 (H) 0.00 - 0.85 K/uL    Eos (Absolute) 0.01 0.00 - 0.51 K/uL    Baso (Absolute) 0.04 0.00 - 0.12 K/uL    Immature Granulocytes (abs) 0.09 0.00 - 0.11 K/uL    NRBC (Absolute) 0.00 K/uL   HOLD BLOOD BANK SPECIMEN (NOT TESTED)    Collection Time: 24  9:17 PM   Result Value Ref Range    Holding Tube - Bb DONE    EKG    Collection Time: 24  9:17 PM   Result Value Ref Range    Report       [facility] Emergency Dept.    Test Date:  2024  Pt Name:    BETINA ANN              Department: ER  MRN:        [id number]                      Room:       TRAUMA - EXAM 1  Gender:     Male                         Technician: [id number]  :        1950                   Requested By:LOYDA PEREZ  Order #:    [id number]                    Reading  MD:    Measurements  Intervals                                Axis  Rate:       78                           P:          33  MN:         49                           QRS:        -25  QRSD:       85                           T:          59  QT:         347  QTc:        396    Interpretive Statements  Sinus rhythm  Multiform ventricular premature complexes  Short MN interval  Inferior infarct, recent  Lateral leads are also involved  No previous ECG available for comparison     EKG STAT    Collection Time: 24 11:44 PM   Result Value Ref Range    Report       [facility]    Test Date:  2024  Pt Name:    BETINA ANN              Department: 161  MRN:        [id number]                      Room:       T628  Gender:     Male                         Technician: CHICO  :        1950                   Requested By:OSITO ALFARO  Order #:    [id number]                    Reading MD:    Measurements  Intervals                                Axis  Rate:       66                           P:          -79  MN:         132                          QRS:        -31  QRSD:       87                           T:          32  QT:         391  QTc:        410    Interpretive Statements  Ectopic atrial rhythm  Inferior infarct, age indeterminate  Probable anteroseptal infarct, old  Lateral leads are also involved  Compared to ECG 2024 21:17:20  Ectopic atrial rhythm now present  Sinus rhythm no longer present  Ventricular premature complex(es) no longer present  Short MN interval no longer present  Myocardial infarct finding still  present       I have independently interpreted this EKG    RADIOLOGY/PROCEDURES   The attending emergency physician has independently interpreted the diagnostic imaging associated with this visit and am waiting the final reading from the radiologist.   My preliminary interpretation is a follows: No pneumothorax    Radiologist interpretation:  DX-CHEST-PORTABLE (1 VIEW)   Final  Result      No acute cardiopulmonary abnormality.      DX-OUTSIDE IMAGES-DX CHEST   Final Result      CL-LEFT HEART CATHETERIZATION WITH POSSIBLE INTERVENTION    (Results Pending)   CL-LEFT HEART CATHETERIZATION WITH POSSIBLE INTERVENTION    (Results Pending)       COURSE & MEDICAL DECISION MAKING     ASSESSMENT, COURSE AND PLAN  Care Narrative: Patient seen at bedside with pharmacy and nursing cardiology at bedside.  Reviewed outside hospital ECG which does show signs of inferior STEMI.  Patient reports ongoing mild pain but significant movement after TNK and heparin.  Given patient's ongoing pain cardiology decided to take patient for left heart catheterization.  Spoke with pharmacy regarding ongoing administration of heparin.      9:05 PM Patient presents to the ED as a STEMI transfer. Patient will be treated with Heparin. Ordered for EKG, labs, and imaging to evaluate his symptoms.     9:35 PM - I discussed the patient's case and the above findings with Dr. Kapoor (Cardiology) who will take patient to the cath lab.      DISPOSITION AND DISCUSSIONS  I have discussed management of the patient with the following physicians and ROSITA's:  Dr. Kapoor (cardiology)    Discussion of management with other Q or appropriate source(s): None     Barriers to care at this time, including but not limited to: Patient does not have established PCP.         DISPOSITION:  Patient will be hospitalized by Dr. Landon Kapoor    FINAL DIANGOSIS  1. ST elevation myocardial infarction involving right coronary artery (HCC)           The note accurately reflects work and decisions made by me.  Toan Toribio D.O.  11/17/2024  2:28 AM

## 2024-11-17 NOTE — ASSESSMENT & PLAN NOTE
Inf STEMI s/p AMARA to RCA  Residual LAD and LCx disease: Cards planning staged LHC as outpt  DAPT: ASA plavix  High dose statin  Metoprolol 12.5 mg BID; pressures borderline so will decrease to 12.5mg of the SR preparation daily  A1c 7.0  LDL 80  Cont Tele  TTE LVEF 55% with wall motion abnormalities  Cards following, PCI as outpatient  Add SGLT2i to outpt regimen

## 2024-11-17 NOTE — DISCHARGE PLANNING
STEMI Response    Referral: Code STEMI Response    Intervention: MSW responded to Stemi.  Pt was B[facility] after STEMI.  Pt was alert upon arrival.  Pts name is Rancho Grewal (: 1950).  SW obtained the following pt information: SW spoke to the pt and he advised SW that his family is aware of him being  brought to [facility] and he also has his cell phone with him.  The patient emergency contact is is daughter Scout.     Scout (daughter) [phone]    Plan: SW will remain available for pt support.

## 2024-11-17 NOTE — PROGRESS NOTES
4 Eyes Skin Assessment Completed by GIAN Dunbar and GIAN Mills.    Head Blanching and Redness  Ears Redness and Blanching  Nose WDL  Mouth WDL  Neck WDL  Breast/Chest WDL  Shoulder Blades WDL  Spine WDL  (R) Arm/Elbow/Hand Redness and Blanching  (L) Arm/Elbow/Hand Redness and Blanching  Abdomen WDL  Groin Redness and Blanching  Scrotum/Coccyx/Buttocks Redness, Blanching, and Moisture Fissure  (R) Leg WDL  (L) Leg WDL  (R) Heel/Foot/Toe Redness and Blanching  (L) Heel/Foot/Toe Redness and Blanching          Devices In Places ECG, Blood Pressure Cuff, Pulse Ox, and SCD's      Interventions In Place Gray Ear Foams, Heel Mepilex, Sacral Mepilex, Q2 Turns, Low Air Loss Mattress, Heels Loaded W/Pillows, and Pressure Redistribution Mattress    Possible Skin Injury No    Pictures Uploaded Into Epic Yes  Wound Consult Placed N/A  RN Wound Prevention Protocol Ordered Yes

## 2024-11-17 NOTE — PROGRESS NOTES
Cardiology Follow-up Note    Name:   Rancho He     YOB: 1950  Age:   74 y.o.  male   MRN:   [id number]        Attending Provider: Dr Landon Bhatia M*     Chief Complaint: No chief complaint on file.       Reason for cardiology consult: Inferior STEMI    Outpatient Cardiologist: None    History of Present Illness  Rancho He is 74 y.o. male with prior medical history significant for hypertension, diabetes mellitus, hyperlipidemia, former smoker who was transferred from [facility] on 11/16/2024 with inferior STEMI.  He received TNK, nitroglycerin, heparin, Plavix, aspirin.  He was emergently sent to Cath Lab due to ongoing chest pain and failed TNK.,  Found to have    Interim Events 11/17/24   :  - Personal Telemetry interpretation: Sinus rhythm in the 60s-70s  Patient reports a residual minor chest pain in the center.  Patient denies shortness of breath, edema, dizziness/lightheadedness, or palpitations.   - Vitals: 116/57  - Labs reviewed: Creatinine 2.17         Review of Systems   Constitutional:  Positive for malaise/fatigue.   Respiratory:  Positive for cough.    Cardiovascular:  Positive for chest pain. Negative for palpitations, orthopnea and leg swelling.   Gastrointestinal:  Negative for abdominal pain.   Neurological:  Positive for tremors. Negative for dizziness.   Psychiatric/Behavioral: Negative.          Medical History  No past medical history on file.      No family history on file.      Social History     Tobacco Use    Smoking status: Former     Types: Cigarettes    Smokeless tobacco: Current     Types: Chew   Vaping Use    Vaping status: Never Used         No Known Allergies      Medications   Scheduled Medications   Medication Dose Frequency    MD Alert...Adult ICU Electrolyte Replacement per Pharmacy   PHARMACY TO DOSE    insulin lispro  2-9 Units 4X/DAY ACHS    atorvastatin  40 mg Q EVENING    aspirin  81 mg DAILY    clopidogrel  75 mg DAILY  "          Physical Exam    Body mass index is 22.02 kg/m².     /57   Pulse 72   Temp 36.4 °C (97.6 °F) (Temporal)   Resp 20   Ht 1.803 m (5' 11\")   Wt 71.6 kg (157 lb 13.6 oz)   SpO2 95%      Vitals:    11/17/24 0700 11/17/24 0800 11/17/24 0900 11/17/24 1000   BP: 107/58 116/58 103/55 116/57   Pulse: 66 66 64 72   Resp: (!) 23 20 (!) 22 20   Temp:  37.1 °C (98.7 °F) 36.5 °C (97.7 °F) 36.4 °C (97.6 °F)   TempSrc:  Temporal Temporal Temporal   SpO2: 90% 95% 90% 95%   Weight:       Height:            Oxygen Therapy:  Pulse Oximetry: 95 %, O2 (LPM): 0, O2 Delivery Device: Room air w/o2 available      Physical Exam  Constitutional:       Appearance: Normal appearance.   Cardiovascular:      Rate and Rhythm: Normal rate and regular rhythm. Occasional Extrasystoles are present.     Heart sounds: No murmur heard.     No friction rub.   Pulmonary:      Breath sounds: No wheezing or rales.   Abdominal:      Palpations: Abdomen is soft.   Musculoskeletal:      Right lower leg: No edema.      Left lower leg: No edema.   Skin:     General: Skin is warm and dry.      Comments: Right radial site is without evidence of hematoma, soft   Neurological:      Mental Status: He is alert and oriented to person, place, and time.      Motor: Tremor present.   Psychiatric:         Mood and Affect: Mood normal.         Behavior: Behavior normal.         Thought Content: Thought content normal.         Judgment: Judgment normal.               Labs (personally reviewed):     Estimated Creatinine Clearance: 30.7 mL/min (A) (by C-G formula based on SCr of 2.17 mg/dL (H)).    No results found for: \"BNPBTYPENAT\"  Recent Labs     11/17/24  0130 11/17/24  0517   CREATININE 2.23* 2.17*   BUN 35* 36*   POTASSIUM 4.4 4.4   SODIUM 138 137   CALCIUM 8.2* 8.2*   MAGNESIUM  --  2.0   CO2 19* 19*     Recent Labs     11/17/24 0130 11/17/24  0517   GLUCOSE 185* 198*     No results for input(s): \"ASTSGOT\", \"ALTSGPT\", \"ALKPHOSPHAT\", \"INR\" in the " "last 72 hours.    Invalid input(s): \"BILI\"  Recent Labs     11/16/24 2117 11/17/24  0130   WBC 16.6* 13.0*   HEMOGLOBIN 12.5* 11.7*   PLATELETCT 261 178     Recent Labs     11/17/24  0130   HBA1C 7.0*     Lab Results   Component Value Date/Time    CHOLSTRLTOT 135 11/17/2024 05:17 AM    LDL 80 11/17/2024 05:17 AM    HDL 40 11/17/2024 05:17 AM    TRIGLYCERIDE 75 11/17/2024 05:17 AM       Imaging:  DX-CHEST-PORTABLE (1 VIEW)   Final Result      No acute cardiopulmonary abnormality.      DX-OUTSIDE IMAGES-DX CHEST   Final Result      CL-LEFT HEART CATHETERIZATION WITH POSSIBLE INTERVENTION    (Results Pending)   CL-LEFT HEART CATHETERIZATION WITH POSSIBLE INTERVENTION    (Results Pending)   EC-ECHOCARDIOGRAM COMPLETE W/O CONT    (Results Pending)       Cardiac Imaging and Procedures Review      Echocardiogram -pending      Cardiac Catheterization 11/16/2024 by Dr. Martino :    INDICATION/PREOPERATIVE DIAGNOSIS:  Inferior STEMI status post failed TNK  Tremor  Hypertension  Diabetes mellitus    POSTOPERATIVE DIAGNOSIS:  99% thrombotic culprit proximal and mid RCA stenosis, JUNIOR I flow  70% focal mid LAD stenosis  70 to 80% focal proximal LCx stenosis  LVEF 75%, LVEDP 12 mmHg  Successful emergency PCI culprit proximal and mid RCA (3.25 x 48 mm Synergy AMARA), excellent result     RECOMMENDATIONS:  Guideline directed medical therapy   Cardiovascular Risk factor modification  Dual antiplatelet therapy for minimum 1 year  Outpatient staged PCI to LAD and circumflex in 6 to 12 weeks versus consideration for interval CABG. Given his low syntax score and preserved EF despite diabetes he would derive limited excessive benefit from CABG versus staged PCI.  Expressly recommend against inpatient staged intervention.    FINDINGS:  I.  HEMODYNAMICS              Ao: 91/50 mmHg              LEDP: 12 mmHg              Gradient on LV pullback: No     II. CORONARY ANGIOGRAPHY:  Left main coronary artery: Large bifurcating short no " CAD  Left anterior descending artery: Moderate to large caliber tortuous with a focal 70% concentric midportion mildly calcified stenosis.  Usual complement diagonals.  Left circumflex coronary artery: Moderate caliber nondominant with a mildly calcific focal 70 to 80% proximal stenosis and no other significant epicardial stenosis in this territory.  Right coronary artery: Moderate caliber dominant supplying the RPDA.  There is a thrombotic ulcerated acute plaque rupture stenosis of the proximal and mid RCA with JUNIOR I flow despite recent thrombolytic administration.  Post PCI there is a percent residual stenosis and JUNIOR-3 flow.      Principal Problem:    ST elevation myocardial infarction involving right coronary artery (HCC) (POA: Unknown)  Active Problems:    Primary hypertension (POA: Unknown)    Type 2 diabetes mellitus (HCC) (POA: Unknown)    Tremor (POA: Unknown)    Normocytic normochromic anemia (POA: Unknown)    Bandemia (POA: Unknown)    LAUREL (acute kidney injury) ([facility]) (POA: Unknown)    Coronary artery disease (POA: Unknown)  Resolved Problems:    * No resolved hospital problems. *      Assessment and Medical Decision Making:    #.  Inferior STEMI  #.  Coronary artery disease, s/p RCA stent  #.  Hypertension  #.  Diabetes mellitus type 2, A1c 7%  #.  Prior tobacco use  #. LAUREL      Recommendations:  Patient presents with an acute inferior STEMI s/p failed TNK.  [facility] 11/16/24 - found 99% thrombotic culprit proximal and mid RCA stenosis, 70% focal mid LAD stenosis, and 70 to 80% focal proximal LCx stenosis.  S/p PCI culprit proximal and mid RCA (3.25 x 48 mm Synergy AAMRA)   Patient reports minor residual chest pain today.  Normotensive.  In sinus rhythm.  -Dual antiplatelet therapy for minimum 1 year with aspirin 81 mg and clopidogrel 75 mg daily  - Outpatient staged PCI to LAD and circumflex in 6 to 12 weeks versus consideration for interval CABG.  GDMT for secondary prevention for ASCVD:  -HI Statin,   -atorvastatin 40 mg nightly for goal LDL less than 70.  Last LDL 80 on 11/17/2024  -BB  -start metoprolol 25 mg twice daily  -Obtain echocardiogram    -Meds-to-beds on discharge  -Cardiac rehab referral placed  Education   -[facility] site care instructions provided  -Discussed returning to ER if chest pain returns and/or call cardiology office at [phone] with any additional new or worsening symptoms  -Discussed CV risk factor modification including cholesterol management, blood pressure management, smoking cessation, diet and lifestyle changes.     -Encourage continuous tobacco cessation        No future appointments.     I personally discussed his case with  Dr Landon Bhatia, M*    Please contact me with any questions.  Thank you for allowing us to participate in the care of Mr. He.      ELY Gerardo  [facility]  [phone]    Please see Dr. Stephens  attestation for further details and MDM. I, ELY Gerardo performed a portion of the service face-to-face with the same patient on the same date of service independently of Dr. Stephens FOR 15 minutes preparing to see the patient, reviewing hospital notes and tests, obtaining history from the patient, performing a medically appropriate exam, counseling and educating the patient, ordering medications/tests/procedures/referrals as clinically indicated, and documenting information in the electronic medical record.    Please note this dictation was created using voice recognition software.  I have made every reasonable attempt to correct obvious errors, but there may be errors of grammar and possibly content that I did not discover before finalizing the note.

## 2024-11-17 NOTE — ASSESSMENT & PLAN NOTE
Bilateral blinking tremor.  Chronic per patient report.  No apparent trigger.  Takes medication twice a day but does not remember the name.

## 2024-11-17 NOTE — ASSESSMENT & PLAN NOTE
A1c 7.0  On no meds as outpt  Good candidate for an SGLT2i +/- metformin given new ischemic cardiomyopathy  SSI  Carb consistent

## 2024-11-17 NOTE — CONSULTS
"Hospital Medicine Consultation    Date of Service  11/17/2024    Referring Physician  DONALD Cunningham*    Consulting Physician  Jesus Alberto Jauregui D.O.    Reason for Consultation  STEMI    History of Presenting Illness  74 y.o. male who presented 11/16/2024 with a history of DM, HTN, HLD who presented to the ED in [facility] with CP.  He was found to have ST elevation in inferior leads and was given TNK prior to being sent to us.  Here Trop 13k and taken to the cath lab where a 99% proximal culprit RCA lesion was treated with PCI/AMARA.  He was also found to have LAD and LCx disease.      On my examination pt reports a \"little twinge\" of pain with movement but nothing like what he was experiencing when he first went to the ED.  No other complaints    Review of Systems  Review of Systems   Constitutional:  Negative for chills and fever.   Respiratory:  Negative for cough and shortness of breath.    Cardiovascular:  Positive for chest pain. Negative for palpitations and leg swelling.   Gastrointestinal:  Negative for abdominal pain, diarrhea, nausea and vomiting.   Genitourinary:  Negative for dysuria and urgency.   Musculoskeletal:  Negative for back pain.   Skin:  Negative for rash.   Neurological:  Negative for dizziness, loss of consciousness and headaches.       Past Medical History   has no past medical history on file.    Surgical History   has no past surgical history on file.    Family History  family history is not on file.    Social History   reports that he has quit smoking. His smoking use included cigarettes. His smokeless tobacco use includes chew.    Medications  None       Allergies  No Known Allergies    Physical Exam  Temp:  [36.4 °C (97.6 °F)-37.5 °C (99.5 °F)] 36.4 °C (97.6 °F)  Pulse:  [63-73] 72  Resp:  [20-31] 20  BP: ()/() 116/57  SpO2:  [90 %-97 %] 95 %    Physical Exam  Constitutional:       General: He is not in acute distress.     Appearance: He is well-developed. " He is not diaphoretic.   HENT:      Head: Normocephalic and atraumatic.   Eyes:      Conjunctiva/sclera: Conjunctivae normal.   Neck:      Vascular: JVD present.   Cardiovascular:      Rate and Rhythm: Normal rate.      Heart sounds: No murmur heard.     No gallop.   Pulmonary:      Effort: Pulmonary effort is normal. No respiratory distress.      Breath sounds: No stridor. No wheezing or rales.   Abdominal:      Palpations: Abdomen is soft.      Tenderness: There is no abdominal tenderness. There is no guarding or rebound.   Musculoskeletal:      Right lower leg: No edema.      Left lower leg: No edema.   Skin:     General: Skin is warm and dry.      Capillary Refill: Capillary refill takes less than 2 seconds.      Findings: No rash.   Neurological:      Mental Status: He is alert and oriented to person, place, and time.   Psychiatric:         Mood and Affect: Mood normal.         Behavior: Behavior normal.         Thought Content: Thought content normal.         Fluids  Date 11/17/24 0700 - 11/18/24 0659   Shift 6482-5334 9528-3285 3274-8841 24 Hour Total   INTAKE   P.O. 120   120   Shift Total 120   120   OUTPUT   Urine 300   300   Shift Total 300   300   Weight (kg) 71.6 71.6 71.6 71.6       Laboratory  Recent Labs     11/16/24  2117 11/17/24  0130   WBC 16.6* 13.0*   RBC 3.98* 3.76*   HEMOGLOBIN 12.5* 11.7*   HEMATOCRIT 38.6* 36.5*   MCV 97.0 97.1   MCH 31.4 31.1   MCHC 32.4 32.1*   RDW 50.5* 49.1   PLATELETCT 261 178   MPV 11.4 10.1     Recent Labs     11/17/24  0130 11/17/24  0517   SODIUM 138 137   POTASSIUM 4.4 4.4   CHLORIDE 108 106   CO2 19* 19*   GLUCOSE 185* 198*   BUN 35* 36*   CREATININE 2.23* 2.17*   CALCIUM 8.2* 8.2*              Recent Labs     11/17/24  0517   TRIGLYCERIDE 75   HDL 40   LDL 80        Imaging  EC-ECHOCARDIOGRAM COMPLETE W/O CONT         DX-CHEST-PORTABLE (1 VIEW)   Final Result      No acute cardiopulmonary abnormality.      DX-OUTSIDE IMAGES-DX CHEST   Final Result      CL-LEFT  HEART CATHETERIZATION WITH POSSIBLE INTERVENTION    (Results Pending)   CL-LEFT HEART CATHETERIZATION WITH POSSIBLE INTERVENTION    (Results Pending)       Assessment/Plan  * ST elevation myocardial infarction involving right coronary artery (HCC)  Assessment & Plan  Inf STEMI s/p AMARA to RCA  Residual LAD and LCx disease: Cards planning staged LHC as outpt  DAPT: ASA plavix  High dose statin  Metoprolol 12.5 mg BID; transition to SR on DC  A1c 7.0  LDL 80  Cont Tele  TTE pending  Cards following  Add SGLT2i to outpt regimen    LAUREL (acute kidney injury) ([facility])  Assessment & Plan  Unknown baseline creat  Improved s/p revascularization  ?cardiorenalsyndrome  Daily BMP  Good UOP  Renally dose medications as appropriate  Renal US    Normocytic normochromic anemia  Assessment & Plan  Check stool guaiac    Type 2 diabetes mellitus (HCC)  Assessment & Plan  A1c 7.0  On no meds as outpt  Good candidate for an SGLT2i +/- metformin given new ischemic cardiomyopathy  SSI  Carb consistent      Primary hypertension  Assessment & Plan  On no meds as outpt  Have add BB in house  Cont to titrate as pressures dictate

## 2024-11-17 NOTE — PROGRESS NOTES
Patient arrived via EMS. TNK given at 1923. Patient reports pain 2/10. Okay to restart heparin gtt at 12units/kg. Verified with Pharmacist. Patient taken to cath lab for possible intervention.

## 2024-11-17 NOTE — DIETARY
Nutrition Services: Cardiac Education Consult   Day 1 of admit.  Rancho He is a 74 y.o. male with admitting DX of STEMI (ST elevation myocardial infarction)     RD received consult for heart healthy diet education. RD included nutrition recommendations and tips in dietary discharge instructions that align with pt's current dx. Outpatient resources included to encourage follow-up with [facility] for continued support after discharge.     No other education needs identified at this time. Please consult RD as needed for supplemental education or at request of pt.

## 2024-11-17 NOTE — CARE PLAN
The patient is Watcher - Medium risk of patient condition declining or worsening    Shift Goals  Clinical Goals: Neuro checks per TNK protocol  Patient Goals: Rest  Family Goals: ENMA (not present)    Progress made toward(s) clinical / shift goals:    Problem: Pain - Standard  Goal: Alleviation of pain or a reduction in pain to the patient’s comfort goal  Outcome: Progressing  Note: Medication available per MAR.      Problem: Fall Risk  Goal: Patient will remain free from falls  Outcome: Progressing

## 2024-11-17 NOTE — ASSESSMENT & PLAN NOTE
Query underlying CKD  Check urinalysis  Monitor renal function and urine output  Avoid nephrotoxins and renal dose medications

## 2024-11-17 NOTE — H&P
[facility] Internal Medicine History & Physical Note    Date of Service  11/17/2024    R Team: R ICU Gold Team   Attending: Landon Kapoor M.d.  Senior Resident: Armando R. Reyes Yparraguirre, M.D.  Contact Number: [phone]    Primary Care Physician  No primary care provider on file.    Consultants  cardiology    Specialist Names: Landon Kapoor M.D.    Code Status  Full Code    Chief Complaint  No chief complaint on file.    History of Presenting Illness (HPI): Rancho He is a 74 y.o. male admitted to the CICU on 11/16/2024 for inferior STEMI status post successful emergency PCI with AMARA to culprit proximal and mid RCA, after failing tenecteplase at outside facility.  Chronic conditions include hypertension and non-insulin-dependent type 2 diabetes mellitus. Cardio risk factors include hypertension, diabetes, and remote history of tobacco (quit 20 years ago) but current tobacco chewer.  No cardiac history in the family.  At baseline, no chest discomfort, no signs of heart failure, no shortness of breath, does not use/need oxygen.    Initially presented to the emergency department at [facility] with chest discomfort which started around 6 AM, no specific triggers, not relieved by rest.  At that facility was treated unsuccessfully with tenecteplase and was eventually transferred via helicopter for higher level of care.    In our ED, persistent chest discomfort around 9-10 with significant difficulty to take deep breaths.  Borderline low blood pressure with MAP 64, rest of vitals within reference levels. CBC showed normocytic normochromic anemia with leukocytosis with left shift.  Benign chest x-ray.  Received full dose aspirin, nitroglycerin and was started on heparin drip.  Evaluated by cardiac electrophysiology and interventional cardiology with plans for emergent PCI.    Underwent PCI which showed:  99% thrombotic culprit proximal and mid RCA stenosis, JUNIOR I flow  70% focal mid LAD stenosis  70 to 80% focal  proximal LCx stenosis  LVEF 75%, LVEDP 12 mmHg  Successful emergency PCI culprit proximal and mid RCA (3.25 x 48 mm Synergy AMARA), excellent result    Recommendations include:  - Guideline directed medical therapy   - Cardiovascular Risk factor modification  - Dual antiplatelet therapy for minimum 1 year  - Outpatient staged PCI to LAD and circumflex in 6 to 12 weeks versus consideration for interval CABG. Given his low syntax score and preserved EF despite diabetes he would derive limited excessive benefit from CABG versus staged PCI. Expressly recommend against inpatient staged intervention.    I discussed the plan of care with patient and bedside RN.    Review of Systems  Review of Systems   Constitutional: Negative.  Negative for chills, fever and weight loss.   HENT: Negative.     Eyes: Negative.    Respiratory: Negative.  Negative for cough, hemoptysis and sputum production.    Cardiovascular:  Positive for chest pain. Negative for palpitations and orthopnea.   Gastrointestinal: Negative.  Negative for abdominal pain and heartburn.   Genitourinary: Negative.  Negative for dysuria.   Musculoskeletal: Negative.  Negative for myalgias.   Skin: Negative.  Negative for rash.   Neurological: Negative.  Negative for dizziness, tingling and headaches.   Endo/Heme/Allergies: Negative.    Psychiatric/Behavioral: Negative.  Negative for depression, hallucinations and suicidal ideas.    All other systems reviewed and are negative.      Past Medical History   has no past medical history on file.    Surgical History   has no past surgical history on file.     Family History  family history is not on file.   Family history reviewed with patient.     Social History  Tobacco: Current.  Chews  Alcohol: Sixpack a week, quit 5 years ago  Recreational drugs (illegal or prescription): Denies  Employment: Retired  Living Situation: Lives with granddaughter in [place]  Recent Travel: Denies  Primary Care Provider: Not on file  Other  (stressors, spirituality, exposures): Denies    Allergies  No Known Allergies    Medications  None       Physical Exam  Temp:  [37.4 °C (99.4 °F)-37.5 °C (99.5 °F)] 37.4 °C (99.4 °F)  Pulse:  [64-73] 67  Resp:  [23-31] 24  BP: ()/() 102/55  SpO2:  [93 %-97 %] 93 %  Blood Pressure : 139/78       Pulse: 73       Pulse Oximetry: 93 %     Physical Exam  Vitals and nursing note reviewed.   Constitutional:       General: He is not in acute distress.     Appearance: Normal appearance. He is not ill-appearing, toxic-appearing or diaphoretic.   Cardiovascular:      Rate and Rhythm: Normal rate and regular rhythm.      Pulses: Normal pulses.      Heart sounds: Normal heart sounds. No murmur heard.  Pulmonary:      Effort: Pulmonary effort is normal. No respiratory distress.      Breath sounds: Normal breath sounds. No stridor. No wheezing or rales.   Abdominal:      General: Bowel sounds are normal. There is no distension.      Palpations: Abdomen is soft.      Tenderness: There is no abdominal tenderness. There is no guarding.   Musculoskeletal:         General: No swelling or tenderness. Normal range of motion.      Right lower leg: Edema present.      Left lower leg: Edema present.   Skin:     General: Skin is warm.      Capillary Refill: Capillary refill takes less than 2 seconds.      Coloration: Skin is pale. Skin is not jaundiced.      Findings: No bruising.   Neurological:      General: No focal deficit present.      Mental Status: He is alert. Mental status is at baseline.      Cranial Nerves: No cranial nerve deficit.      Sensory: No sensory deficit.      Coordination: Coordination normal.      Gait: Gait normal.      Deep Tendon Reflexes: Reflexes normal.   Psychiatric:         Mood and Affect: Mood normal.         Behavior: Behavior normal.         Thought Content: Thought content normal.         Judgment: Judgment normal.     Laboratory:  Recent Labs     11/16/24 2117   WBC 16.6*   RBC 3.98*  "  HEMOGLOBIN 12.5*   HEMATOCRIT 38.6*   MCV 97.0   MCH 31.4   MCHC 32.4   RDW 50.5*   PLATELETCT 261   MPV 11.4         No results for input(s): \"ALTSGPT\", \"ASTSGOT\", \"ALKPHOSPHAT\", \"TBILIRUBIN\", \"DBILIRUBIN\", \"GAMMAGT\", \"AMYLASE\", \"LIPASE\", \"ALB\", \"PREALBUMIN\", \"GLUCOSE\" in the last 72 hours.      No results for input(s): \"NTPROBNP\" in the last 72 hours.      No results for input(s): \"TROPONINT\" in the last 72 hours.    Imaging:  DX-CHEST-PORTABLE (1 VIEW)   Final Result      No acute cardiopulmonary abnormality.      DX-OUTSIDE IMAGES-DX CHEST   Final Result      CL-LEFT HEART CATHETERIZATION WITH POSSIBLE INTERVENTION    (Results Pending)   CL-LEFT HEART CATHETERIZATION WITH POSSIBLE INTERVENTION    (Results Pending)     X-Ray:  I have personally reviewed the images and compared with prior images.  EKG:  I have personally reviewed the images and compared with prior images.    Assessment/Plan: 74-year-old male admitted to the CICU on 11/6/2024 for inferior STEMI status post successful emergency PCI with AMARA to culprit proximal and mid RCA, after failing tenecteplase at outside facility.    I anticipate this patient will require at least two midnights for appropriate medical management, necessitating inpatient admission because STEMI status post PCI  Patient will need a ICU (Adult and Pediatrics) bed on CARDIOLOGY service .  The need is secondary to STEMI status post PCI.    ST elevation myocardial infarction involving right coronary artery (HCC)  Assessment & Plan  Status post PCI:  chest discomfort around 2-3/10.  No shortness of breath.    Findings included:  99% thrombotic culprit proximal and mid RCA stenosis, JUNIOR I flow  70% focal mid LAD stenosis  70 to 80% focal proximal LCx stenosis  LVEF 75%, LVEDP 12 mmHg  Successful emergency PCI culprit proximal and mid RCA (3.25 x 48 mm Synergy AMARA), excellent result    Recommendation for cardiology:  - Guideline directed medical therapy   - Cardiovascular Risk " factor modification  - Dual antiplatelet therapy for minimum 1 year  - Outpatient staged PCI to LAD and circumflex in 6 to 12 weeks versus consideration for interval CABG. Given his low syntax score and preserved EF despite diabetes he would derive limited excessive benefit from CABG versus staged PCI. Expressly recommend against inpatient staged intervention.    Normocytic normochromic anemia  Assessment & Plan  No prior records.  No signs of active bleeding.  Thin patient with BMI 22, nutritional? No history of recent alcohol use.  Monitor    Non-insulin dependent type 2 diabetes mellitus (HCC)  Assessment & Plan  No prior records.  Reports taking medication p.o. but does not specify.  Check CMP, A1c and lipid profile.  Sliding scale, target glucose below 180    Hypertension  Assessment & Plan  No prior records.  Reports taking medication but does not specify.  Keep MAP above 65    Bandemia  Assessment & Plan  Probably reactive.  No signs of infection.  No sepsis criteria.  Monitor    Tremor  Assessment & Plan  Bilateral blinking tremor.  Chronic per patient report.  No apparent trigger.  Takes medication twice a day but does not remember the name.      VTE prophylaxis: therapeutic anticoagulation with bivalirudin      Armando R. Reyes Yparraguirre, M.D.  Internal Medicine Senior Resident   [facility]      This note was generated using voice recognition software which has a small chance of producing errors of grammar and possibly content. I have made every reasonable attempt to find and correct any obvious errors but expect that some may not be found prior to finalization of this note.

## 2024-11-18 ENCOUNTER — APPOINTMENT (OUTPATIENT)
Dept: CARDIOLOGY | Facility: MEDICAL CENTER | Age: 74
DRG: 322 | End: 2024-11-18
Attending: INTERNAL MEDICINE
Payer: MEDICARE

## 2024-11-18 LAB
ANION GAP SERPL CALC-SCNC: 12 MMOL/L (ref 7–16)
BASOPHILS # BLD AUTO: 0.3 % (ref 0–1.8)
BASOPHILS # BLD: 0.03 K/UL (ref 0–0.12)
BUN SERPL-MCNC: 41 MG/DL (ref 8–22)
CALCIUM SERPL-MCNC: 8.1 MG/DL (ref 8.5–10.5)
CHLORIDE SERPL-SCNC: 106 MMOL/L (ref 96–112)
CO2 SERPL-SCNC: 21 MMOL/L (ref 20–33)
CREAT SERPL-MCNC: 2.24 MG/DL (ref 0.5–1.4)
EOSINOPHIL # BLD AUTO: 0.03 K/UL (ref 0–0.51)
EOSINOPHIL NFR BLD: 0.3 % (ref 0–6.9)
ERYTHROCYTE [DISTWIDTH] IN BLOOD BY AUTOMATED COUNT: 48.6 FL (ref 35.9–50)
GFR SERPLBLD CREATININE-BSD FMLA CKD-EPI: 30 ML/MIN/1.73 M 2
GLUCOSE BLD STRIP.AUTO-MCNC: 109 MG/DL (ref 65–99)
GLUCOSE BLD STRIP.AUTO-MCNC: 120 MG/DL (ref 65–99)
GLUCOSE BLD STRIP.AUTO-MCNC: 170 MG/DL (ref 65–99)
GLUCOSE SERPL-MCNC: 150 MG/DL (ref 65–99)
HCT VFR BLD AUTO: 33 % (ref 42–52)
HGB BLD-MCNC: 11 G/DL (ref 14–18)
IMM GRANULOCYTES # BLD AUTO: 0.05 K/UL (ref 0–0.11)
IMM GRANULOCYTES NFR BLD AUTO: 0.5 % (ref 0–0.9)
LYMPHOCYTES # BLD AUTO: 1.3 K/UL (ref 1–4.8)
LYMPHOCYTES NFR BLD: 13.6 % (ref 22–41)
MAGNESIUM SERPL-MCNC: 2.3 MG/DL (ref 1.5–2.5)
MCH RBC QN AUTO: 32 PG (ref 27–33)
MCHC RBC AUTO-ENTMCNC: 33.3 G/DL (ref 32.3–36.5)
MCV RBC AUTO: 95.9 FL (ref 81.4–97.8)
MONOCYTES # BLD AUTO: 0.79 K/UL (ref 0–0.85)
MONOCYTES NFR BLD AUTO: 8.2 % (ref 0–13.4)
NEUTROPHILS # BLD AUTO: 7.38 K/UL (ref 1.82–7.42)
NEUTROPHILS NFR BLD: 77.1 % (ref 44–72)
NRBC # BLD AUTO: 0 K/UL
NRBC BLD-RTO: 0 /100 WBC (ref 0–0.2)
PHOSPHATE SERPL-MCNC: 3.5 MG/DL (ref 2.5–4.5)
PLATELET # BLD AUTO: 169 K/UL (ref 164–446)
PMV BLD AUTO: 10.6 FL (ref 9–12.9)
POTASSIUM SERPL-SCNC: 4.1 MMOL/L (ref 3.6–5.5)
RBC # BLD AUTO: 3.44 M/UL (ref 4.7–6.1)
SODIUM SERPL-SCNC: 139 MMOL/L (ref 135–145)
WBC # BLD AUTO: 9.6 K/UL (ref 4.8–10.8)

## 2024-11-18 PROCEDURE — 99232 SBSQ HOSP IP/OBS MODERATE 35: CPT | Performed by: INTERNAL MEDICINE

## 2024-11-18 PROCEDURE — 85025 COMPLETE CBC W/AUTO DIFF WBC: CPT

## 2024-11-18 PROCEDURE — 82962 GLUCOSE BLOOD TEST: CPT | Mod: 91

## 2024-11-18 PROCEDURE — A9270 NON-COVERED ITEM OR SERVICE: HCPCS | Performed by: HOSPITALIST

## 2024-11-18 PROCEDURE — 99232 SBSQ HOSP IP/OBS MODERATE 35: CPT | Performed by: HOSPITALIST

## 2024-11-18 PROCEDURE — A9270 NON-COVERED ITEM OR SERVICE: HCPCS | Performed by: INTERNAL MEDICINE

## 2024-11-18 PROCEDURE — 770020 HCHG ROOM/CARE - TELE (206)

## 2024-11-18 PROCEDURE — 83735 ASSAY OF MAGNESIUM: CPT

## 2024-11-18 PROCEDURE — 80048 BASIC METABOLIC PNL TOTAL CA: CPT

## 2024-11-18 PROCEDURE — 700102 HCHG RX REV CODE 250 W/ 637 OVERRIDE(OP): Performed by: HOSPITALIST

## 2024-11-18 PROCEDURE — 84100 ASSAY OF PHOSPHORUS: CPT

## 2024-11-18 PROCEDURE — 700102 HCHG RX REV CODE 250 W/ 637 OVERRIDE(OP): Performed by: INTERNAL MEDICINE

## 2024-11-18 RX ORDER — METOPROLOL SUCCINATE 25 MG/1
12.5 TABLET, EXTENDED RELEASE ORAL
Status: DISCONTINUED | OUTPATIENT
Start: 2024-11-18 | End: 2024-11-19

## 2024-11-18 RX ADMIN — ASPIRIN 81 MG: 81 TABLET, COATED ORAL at 05:10

## 2024-11-18 RX ADMIN — CLOPIDOGREL BISULFATE 75 MG: 75 TABLET ORAL at 05:10

## 2024-11-18 RX ADMIN — INSULIN LISPRO 2 UNITS: 100 INJECTION, SOLUTION INTRAVENOUS; SUBCUTANEOUS at 20:54

## 2024-11-18 RX ADMIN — METOPROLOL SUCCINATE 12.5 MG: 25 TABLET, EXTENDED RELEASE ORAL at 11:01

## 2024-11-18 RX ADMIN — ATORVASTATIN CALCIUM 40 MG: 40 TABLET, FILM COATED ORAL at 17:36

## 2024-11-18 ASSESSMENT — PAIN DESCRIPTION - PAIN TYPE
TYPE: ACUTE PAIN

## 2024-11-18 NOTE — PROGRESS NOTES
Hospital Medicine Daily Progress Note    Date of Service  11/18/2024    Chief Complaint  Rancho He is a 74 y.o. male admitted 11/16/2024 with CP    Hospital Course    74 y.o. male who presented 11/16/2024 with a history of DM, HTN, HLD who presented to the ED in [facility] with CP.  He was found to have ST elevation in inferior leads and was given TNK prior to being sent to us.  Here Trop 13k and taken to the cath lab where a 99% proximal culprit RCA lesion was treated with PCI/AMARA.  He was also found to have LAD and LCx disease.      Interval Problem Update  Pt with no complaints today.  Up and ambulating.  No SOB    Sinus 50-70  SBP 90-100s  On 1 LNC yesterday, RA today  UOP 1050ml/24hrs    I have discussed this patient's plan of care and discharge plan at IDT rounds today with Case Management, Nursing, Nursing leadership, and other members of the IDT team.    Consultants/Specialty  cardiology    Code Status  Full Code    Disposition  <MEDICALLYCLEARED>  I have placed the appropriate orders for post-discharge needs.    Review of Systems  ROS     Physical Exam  Temp:  [36.4 °C (97.6 °F)-37.2 °C (99 °F)] 36.6 °C (97.8 °F)  Pulse:  [54-76] 59  Resp:  [6-48] 21  BP: ()/(45-66) 105/61  SpO2:  [86 %-96 %] 95 %    Physical Exam  Constitutional:       General: He is not in acute distress.     Appearance: He is well-developed. He is not diaphoretic.   HENT:      Head: Normocephalic and atraumatic.   Eyes:      Conjunctiva/sclera: Conjunctivae normal.   Neck:      Vascular: No JVD.   Cardiovascular:      Rate and Rhythm: Normal rate.      Heart sounds: No murmur heard.     No gallop.   Pulmonary:      Effort: Pulmonary effort is normal. No respiratory distress.      Breath sounds: No stridor. No wheezing or rales.   Abdominal:      Palpations: Abdomen is soft.      Tenderness: There is no abdominal tenderness. There is no guarding or rebound.   Musculoskeletal:      Right lower leg: No edema.      Left lower  leg: No edema.   Skin:     General: Skin is warm and dry.      Capillary Refill: Capillary refill takes less than 2 seconds.      Findings: No rash.   Neurological:      Mental Status: He is oriented to person, place, and time.      Comments: tremor   Psychiatric:         Thought Content: Thought content normal.         Fluids    Intake/Output Summary (Last 24 hours) at 11/18/2024 0658  Last data filed at 11/18/2024 0310  Gross per 24 hour   Intake 600 ml   Output 1050 ml   Net -450 ml        Laboratory  Recent Labs     11/16/24  2117 11/17/24  0130 11/18/24  0509   WBC 16.6* 13.0* 9.6   RBC 3.98* 3.76* 3.44*   HEMOGLOBIN 12.5* 11.7* 11.0*   HEMATOCRIT 38.6* 36.5* 33.0*   MCV 97.0 97.1 95.9   MCH 31.4 31.1 32.0   MCHC 32.4 32.1* 33.3   RDW 50.5* 49.1 48.6   PLATELETCT 261 178 169   MPV 11.4 10.1 10.6     Recent Labs     11/17/24  0130 11/17/24  0517 11/18/24  0509   SODIUM 138 137 139   POTASSIUM 4.4 4.4 4.1   CHLORIDE 108 106 106   CO2 19* 19* 21   GLUCOSE 185* 198* 150*   BUN 35* 36* 41*   CREATININE 2.23* 2.17* 2.24*   CALCIUM 8.2* 8.2* 8.1*             Recent Labs     11/17/24  0517   TRIGLYCERIDE 75   HDL 40   LDL 80       Imaging  US-RENAL   Final Result      1.  No hydronephrosis bilaterally.   2.  A 5 mm hyperechoic focus in the right renal cortex. Cortical calcification is favored over nonobstructing stone.      EC-ECHOCARDIOGRAM COMPLETE W/O CONT   Final Result      DX-CHEST-PORTABLE (1 VIEW)   Final Result      No acute cardiopulmonary abnormality.      DX-OUTSIDE IMAGES-DX CHEST   Final Result      CL-LEFT HEART CATHETERIZATION WITH POSSIBLE INTERVENTION    (Results Pending)        Assessment/Plan  * ST elevation myocardial infarction involving right coronary artery (HCC)  Assessment & Plan  Inf STEMI s/p AMARA to RCA  Residual LAD and LCx disease: Cards planning staged LHC as outpt  DAPT: ASA plavix  High dose statin  Metoprolol 12.5 mg BID; pressures borderline so will decrease to 12.5mg of the SR  preparation daily  A1c 7.0  LDL 80  Cont Tele  TTE LVEF 55%  Cards following  Add SGLT2i to outpt regimen    LAUREL (acute kidney injury) (HCC)  Assessment & Plan  Unknown baseline creat  Improved s/p revascularization  ?cardiorenalsyndrome vs undiagnosed CKD: will need follow up  Daily BMP  Good UOP  Renally dose medications as appropriate  Renal US neg for hydro    Normocytic normochromic anemia  Assessment & Plan  Check stool guaiac    Type 2 diabetes mellitus (HCC)  Assessment & Plan  A1c 7.0  On no meds as outpt  Good candidate for an SGLT2i +/- metformin given new ischemic cardiomyopathy  SSI  Carb consistent      Primary hypertension  Assessment & Plan  On no meds as outpt  Have add BB in house  Cont to titrate as pressures dictate         VTE prophylaxis: VTE Selection    I have performed a physical exam and reviewed and updated ROS and Plan today (11/18/2024). In review of yesterday's note (11/17/2024), there are no changes except as documented above.

## 2024-11-18 NOTE — PROGRESS NOTES
"74-year-old male patient admitted after inferior STEMI, status post successful PCI of RCA with residual left circumflex artery, LAD disease.    Interval History:  He feels well this morning.  Denies chest pain, shortness of breath.  On mild oxygen.    Physical Exam   Blood pressure 108/56, pulse 64, temperature 37 °C (98.6 °F), temperature source Temporal, resp. rate 19, height 1.803 m (5' 11\"), weight 71.6 kg (157 lb 13.6 oz), SpO2 93%.    Constitutional: Appears frail  HENT: Normocephalic and atraumatic. No scleral icterus.   Neck: No JVD present.   Cardiovascular: Normal rate. Exam reveals no gallop and no friction rub. No murmur heard.   Pulmonary/Chest: Clear  Abdominal: S/NT/ND BS+   Musculoskeletal: Exhibits no edema. Pulses present.  Skin: Skin is warm and dry.     ROS: As HPI other reviewed and negative       Intake/Output Summary (Last 24 hours) at 11/18/2024 1109  Last data filed at 11/18/2024 0900  Gross per 24 hour   Intake 580 ml   Output 990 ml   Net -410 ml       Recent Labs     11/16/24  2117 11/17/24  0130 11/18/24  0509   WBC 16.6* 13.0* 9.6   RBC 3.98* 3.76* 3.44*   HEMOGLOBIN 12.5* 11.7* 11.0*   HEMATOCRIT 38.6* 36.5* 33.0*   MCV 97.0 97.1 95.9   MCH 31.4 31.1 32.0   MCHC 32.4 32.1* 33.3   RDW 50.5* 49.1 48.6   PLATELETCT 261 178 169   MPV 11.4 10.1 10.6     Recent Labs     11/17/24  0130 11/17/24  0517 11/18/24  0509   SODIUM 138 137 139   POTASSIUM 4.4 4.4 4.1   CHLORIDE 108 106 106   CO2 19* 19* 21   GLUCOSE 185* 198* 150*   BUN 35* 36* 41*   CREATININE 2.23* 2.17* 2.24*   CALCIUM 8.2* 8.2* 8.1*                 Recent Labs     11/17/24  0517   TRIGLYCERIDE 75   HDL 40   LDL 80       No current facility-administered medications on file prior to encounter.     No current outpatient medications on file prior to encounter.       Current Facility-Administered Medications   Medication Dose Frequency Provider Last Rate Last Admin    metoprolol SR (Toprol XL) tablet 12.5 mg  12.5 mg Q DAY Jesus Alberto" KEEGAN Jauregui   12.5 mg at 11/18/24 1101    MD Alert...ICU Electrolyte Replacement per Pharmacy   PHARMACY TO DOSE Landon Bhatia M.D.        insulin lispro (HumaLOG,AdmeLOG) subcutaneous injection  2-9 Units 4X/DAY ACHS Landon Bhatia M.D.   2 Units at 11/17/24 2045    And    dextrose 50% (D50W) injection 25 g  25 g Q15 MIN PRN Landon Bhatia M.D.        atorvastatin (Lipitor) tablet 40 mg  40 mg Q EVENING Landon Bhatia M.D.   40 mg at 11/17/24 1724    acetaminophen (Tylenol) tablet 650 mg  650 mg Q6HRS PRN Chintan Martino M.D.   650 mg at 11/17/24 1833    aspirin EC tablet 81 mg  81 mg DAILY Chintan Martino M.D.   81 mg at 11/18/24 0510    clopidogrel (Plavix) tablet 75 mg  75 mg DAILY Chintan Martino M.D.   75 mg at 11/18/24 0510   This patient's medications have not been reviewed.   Medications reviewed    Imaging reviewed    Coronary angiogram 11/16/2024  POSTOPERATIVE DIAGNOSIS:  99% thrombotic culprit proximal and mid RCA stenosis, JUNIOR I flow  70% focal mid LAD stenosis  70 to 80% focal proximal LCx stenosis  LVEF 75%, LVEDP 12 mmHg  Successful emergency PCI culprit proximal and mid RCA (3.25 x 48 mm Synergy AMARA), excellent result    Assessment   Inferior STEMI  PCI proximal/mid RCA 3.25 x 48 mm AMARA  Residual mid LAD and proximal Cx disease  CKD stage IIIb  Diabetes mellitus  Dyslipidemia  Hypertension    Recommendations:  He is doing well post PCI.  He does not appear to have good insight into his disease process.  He does not appear to be a good candidate for bypass surgery for his residual coronary artery disease.  Will obtain a CT surgery consultation inpatient.  If not a good candidate for CT surgery in few weeks, we will set up outpatient PCI of left circumflex artery, LAD with Dr. Martino.  Continue aspirin, Plavix, Lipitor, metoprolol succinate.  Discussed with CT surgeon Dr. Gaines.  If stable, likely discharge tomorrow    Discussed with  primary physician and bedside nursing.    Do not hesitate to call me with questions regarding the patient care.    Matt Clay  Interventional cardiologist

## 2024-11-18 NOTE — PROGRESS NOTES
4 Eyes Skin Assessment Completed by GIAN Schwarz and GIAN Daniels.    Head WDL  Ears Redness and Blanching ( Scab on left ear)  Nose WDL  Mouth WDL  Neck WDL  Breast/Chest WDL  Shoulder Blades Redness and Blanching  Spine Redness and Blanching  (R) Arm/Elbow/Hand Redness, Blanching, and Bruising  (L) Arm/Elbow/Hand Bruising  Abdomen WDL  Groin Redness and Excoriation  Scrotum/Coccyx/Buttocks Redness and Blanching (Mepilex in place)  (R) Leg WDL  (L) Leg WDL  (R) Heel/Foot/Toe WDL  (L) Heel/Foot/Toe WDL          Devices In Places Blood Pressure Cuff and Pulse Ox and PIV      Interventions In Place NC W/Ear Foams, Sacral Mepilex, and Pillows    Possible Skin Injury No    Pictures Uploaded Into Epic N/A  Wound Consult Placed N/A  RN Wound Prevention Protocol Ordered No

## 2024-11-18 NOTE — CARE PLAN
The patient is Watcher - Medium risk of patient condition declining or worsening    Shift Goals  Clinical Goals: Q4 neuro checks, hemodynamic stability  Patient Goals: rest  Family Goals: ENMA    Progress made toward(s) clinical / shift goals:    Problem: Knowledge Deficit - Standard  Goal: Patient and family/care givers will demonstrate understanding of plan of care, disease process/condition, diagnostic tests and medications  Outcome: Progressing     Problem: Pain - Standard  Goal: Alleviation of pain or a reduction in pain to the patient’s comfort goal  Outcome: Progressing     Problem: Fall Risk  Goal: Patient will remain free from falls  Outcome: Progressing     Problem: Stemi  Goal: Optimal Care of the Stemi Patient  Outcome: Progressing     Problem: Safety  Goal: Free from accidental injury  Outcome: Progressing     Problem: Hemodynamic Status  Goal: Stable Vital Signs and Fluid Balance  Outcome: Progressing       Patient is not progressing towards the following goals:

## 2024-11-18 NOTE — CARE PLAN
The patient is Stable - Low risk of patient condition declining or worsening    Shift Goals  Clinical Goals: Q4 neuro checks, hemodynamic stability, prep for cath lab  Patient Goals: Rest, eat  Family Goals: Updates    Progress made toward(s) clinical / shift goals:    Problem: Knowledge Deficit - Standard  Goal: Patient and family/care givers will demonstrate understanding of plan of care, disease process/condition, diagnostic tests and medications  Description: Target End Date:  1-3 days or as soon as patient condition allows    Document in Patient Education    1.  Patient and family/caregiver oriented to unit, equipment, visitation policy and means for communicating concern  2.  Complete/review Learning Assessment  3.  Assess knowledge level of disease process/condition, treatment plan, diagnostic tests and medications  4.  Explain disease process/condition, treatment plan, diagnostic tests and medications  Outcome: Progressing     Problem: Pain - Standard  Goal: Alleviation of pain or a reduction in pain to the patient’s comfort goal  Description: Target End Date:  Prior to discharge or change in level of care    Document on Vitals flowsheet    1.  Document pain using the appropriate pain scale per order or unit policy  2.  Educate and implement non-pharmacologic comfort measures (i.e. relaxation, distraction, massage, cold/heat therapy, etc.)  3.  Pain management medications as ordered  4.  Reassess pain after pain med administration per policy  5.  If opiods administered assess patient's response to pain medication is appropriate per POSS sedation scale  6.  Follow pain management plan developed in collaboration with patient and interdisciplinary team (including palliative care or pain specialists if applicable)  Outcome: Progressing     Problem: Fall Risk  Goal: Patient will remain free from falls  Description: Target End Date:  Prior to discharge or change in level of care    Document interventions on the  Tara Ramires Fall Risk Assessment    1.  Assess for fall risk factors  2.  Implement fall precautions  Outcome: Progressing     Problem: Stemi  Goal: Optimal Care of the Stemi Patient  Outcome: Progressing     Problem: Safety  Goal: Free from accidental injury  Outcome: Progressing     Problem: Hemodynamic Status  Goal: Stable Vital Signs and Fluid Balance  Outcome: Progressing       Patient is not progressing towards the following goals:

## 2024-11-19 LAB
ANION GAP SERPL CALC-SCNC: 11 MMOL/L (ref 7–16)
BASOPHILS # BLD AUTO: 0.3 % (ref 0–1.8)
BASOPHILS # BLD: 0.03 K/UL (ref 0–0.12)
BUN SERPL-MCNC: 39 MG/DL (ref 8–22)
CALCIUM SERPL-MCNC: 8.2 MG/DL (ref 8.5–10.5)
CHLORIDE SERPL-SCNC: 105 MMOL/L (ref 96–112)
CO2 SERPL-SCNC: 23 MMOL/L (ref 20–33)
CREAT SERPL-MCNC: 1.69 MG/DL (ref 0.5–1.4)
EKG IMPRESSION: NORMAL
EOSINOPHIL # BLD AUTO: 0.09 K/UL (ref 0–0.51)
EOSINOPHIL NFR BLD: 1 % (ref 0–6.9)
ERYTHROCYTE [DISTWIDTH] IN BLOOD BY AUTOMATED COUNT: 47 FL (ref 35.9–50)
GFR SERPLBLD CREATININE-BSD FMLA CKD-EPI: 42 ML/MIN/1.73 M 2
GLUCOSE BLD STRIP.AUTO-MCNC: 115 MG/DL (ref 65–99)
GLUCOSE BLD STRIP.AUTO-MCNC: 142 MG/DL (ref 65–99)
GLUCOSE BLD STRIP.AUTO-MCNC: 149 MG/DL (ref 65–99)
GLUCOSE BLD STRIP.AUTO-MCNC: 164 MG/DL (ref 65–99)
GLUCOSE SERPL-MCNC: 162 MG/DL (ref 65–99)
HCT VFR BLD AUTO: 34 % (ref 42–52)
HGB BLD-MCNC: 10.8 G/DL (ref 14–18)
IMM GRANULOCYTES # BLD AUTO: 0.05 K/UL (ref 0–0.11)
IMM GRANULOCYTES NFR BLD AUTO: 0.6 % (ref 0–0.9)
LYMPHOCYTES # BLD AUTO: 1.12 K/UL (ref 1–4.8)
LYMPHOCYTES NFR BLD: 12.6 % (ref 22–41)
MAGNESIUM SERPL-MCNC: 2 MG/DL (ref 1.5–2.5)
MCH RBC QN AUTO: 30.3 PG (ref 27–33)
MCHC RBC AUTO-ENTMCNC: 31.8 G/DL (ref 32.3–36.5)
MCV RBC AUTO: 95.2 FL (ref 81.4–97.8)
MONOCYTES # BLD AUTO: 0.85 K/UL (ref 0–0.85)
MONOCYTES NFR BLD AUTO: 9.6 % (ref 0–13.4)
NEUTROPHILS # BLD AUTO: 6.74 K/UL (ref 1.82–7.42)
NEUTROPHILS NFR BLD: 75.9 % (ref 44–72)
NRBC # BLD AUTO: 0 K/UL
NRBC BLD-RTO: 0 /100 WBC (ref 0–0.2)
PHOSPHATE SERPL-MCNC: 3 MG/DL (ref 2.5–4.5)
PLATELET # BLD AUTO: 211 K/UL (ref 164–446)
PMV BLD AUTO: 10.2 FL (ref 9–12.9)
POTASSIUM SERPL-SCNC: 4 MMOL/L (ref 3.6–5.5)
RBC # BLD AUTO: 3.57 M/UL (ref 4.7–6.1)
SODIUM SERPL-SCNC: 139 MMOL/L (ref 135–145)
WBC # BLD AUTO: 8.9 K/UL (ref 4.8–10.8)

## 2024-11-19 PROCEDURE — 700102 HCHG RX REV CODE 250 W/ 637 OVERRIDE(OP): Performed by: INTERNAL MEDICINE

## 2024-11-19 PROCEDURE — 80048 BASIC METABOLIC PNL TOTAL CA: CPT

## 2024-11-19 PROCEDURE — 99232 SBSQ HOSP IP/OBS MODERATE 35: CPT | Performed by: INTERNAL MEDICINE

## 2024-11-19 PROCEDURE — 99223 1ST HOSP IP/OBS HIGH 75: CPT | Mod: FS | Performed by: THORACIC SURGERY (CARDIOTHORACIC VASCULAR SURGERY)

## 2024-11-19 PROCEDURE — A9270 NON-COVERED ITEM OR SERVICE: HCPCS | Performed by: HOSPITALIST

## 2024-11-19 PROCEDURE — A9270 NON-COVERED ITEM OR SERVICE: HCPCS | Performed by: STUDENT IN AN ORGANIZED HEALTH CARE EDUCATION/TRAINING PROGRAM

## 2024-11-19 PROCEDURE — A9270 NON-COVERED ITEM OR SERVICE: HCPCS | Performed by: INTERNAL MEDICINE

## 2024-11-19 PROCEDURE — 85025 COMPLETE CBC W/AUTO DIFF WBC: CPT

## 2024-11-19 PROCEDURE — 82962 GLUCOSE BLOOD TEST: CPT | Mod: 91

## 2024-11-19 PROCEDURE — 93005 ELECTROCARDIOGRAM TRACING: CPT | Performed by: STUDENT IN AN ORGANIZED HEALTH CARE EDUCATION/TRAINING PROGRAM

## 2024-11-19 PROCEDURE — 770020 HCHG ROOM/CARE - TELE (206)

## 2024-11-19 PROCEDURE — 83735 ASSAY OF MAGNESIUM: CPT

## 2024-11-19 PROCEDURE — 36415 COLL VENOUS BLD VENIPUNCTURE: CPT

## 2024-11-19 PROCEDURE — 99233 SBSQ HOSP IP/OBS HIGH 50: CPT | Performed by: INTERNAL MEDICINE

## 2024-11-19 PROCEDURE — 700102 HCHG RX REV CODE 250 W/ 637 OVERRIDE(OP): Performed by: HOSPITALIST

## 2024-11-19 PROCEDURE — 700102 HCHG RX REV CODE 250 W/ 637 OVERRIDE(OP): Performed by: STUDENT IN AN ORGANIZED HEALTH CARE EDUCATION/TRAINING PROGRAM

## 2024-11-19 PROCEDURE — 84100 ASSAY OF PHOSPHORUS: CPT

## 2024-11-19 RX ORDER — OXYCODONE HYDROCHLORIDE 5 MG/1
5 TABLET ORAL ONCE
Status: COMPLETED | OUTPATIENT
Start: 2024-11-19 | End: 2024-11-19

## 2024-11-19 RX ORDER — METOPROLOL SUCCINATE 25 MG/1
25 TABLET, EXTENDED RELEASE ORAL
Status: DISCONTINUED | OUTPATIENT
Start: 2024-11-20 | End: 2024-11-20 | Stop reason: HOSPADM

## 2024-11-19 RX ORDER — ATORVASTATIN CALCIUM 80 MG/1
80 TABLET, FILM COATED ORAL EVERY EVENING
Status: DISCONTINUED | OUTPATIENT
Start: 2024-11-19 | End: 2024-11-20 | Stop reason: HOSPADM

## 2024-11-19 RX ADMIN — OXYCODONE 5 MG: 5 TABLET ORAL at 00:27

## 2024-11-19 RX ADMIN — ATORVASTATIN CALCIUM 80 MG: 80 TABLET, FILM COATED ORAL at 17:38

## 2024-11-19 RX ADMIN — ASPIRIN 81 MG: 81 TABLET, COATED ORAL at 04:49

## 2024-11-19 RX ADMIN — CLOPIDOGREL BISULFATE 75 MG: 75 TABLET ORAL at 04:49

## 2024-11-19 RX ADMIN — METOPROLOL SUCCINATE 12.5 MG: 25 TABLET, EXTENDED RELEASE ORAL at 04:49

## 2024-11-19 ASSESSMENT — ENCOUNTER SYMPTOMS
EYES NEGATIVE: 1
NEUROLOGICAL NEGATIVE: 1
HEMOPTYSIS: 0
CLAUDICATION: 0
SPEECH CHANGE: 0
VOMITING: 0
RESPIRATORY NEGATIVE: 1
SHORTNESS OF BREATH: 0
ORTHOPNEA: 0
BLURRED VISION: 0
COUGH: 0
NAUSEA: 0
ABDOMINAL PAIN: 0
PHOTOPHOBIA: 0
FEVER: 0
GASTROINTESTINAL NEGATIVE: 1
MYALGIAS: 0
CARDIOVASCULAR NEGATIVE: 1
NECK PAIN: 0
PSYCHIATRIC NEGATIVE: 1
NERVOUS/ANXIOUS: 0
PALPITATIONS: 0
DIZZINESS: 0
BRUISES/BLEEDS EASILY: 0
CONSTITUTIONAL NEGATIVE: 1
DIARRHEA: 0
DOUBLE VISION: 0
WEIGHT LOSS: 0
HEADACHES: 0
MUSCULOSKELETAL NEGATIVE: 1
CONSTIPATION: 0
WEAKNESS: 0
CHILLS: 0

## 2024-11-19 ASSESSMENT — COGNITIVE AND FUNCTIONAL STATUS - GENERAL
CLIMB 3 TO 5 STEPS WITH RAILING: A LITTLE
MOBILITY SCORE: 23
SUGGESTED CMS G CODE MODIFIER DAILY ACTIVITY: CH
DAILY ACTIVITIY SCORE: 24
SUGGESTED CMS G CODE MODIFIER MOBILITY: CI

## 2024-11-19 ASSESSMENT — PAIN DESCRIPTION - PAIN TYPE
TYPE: ACUTE PAIN
TYPE: ACUTE PAIN

## 2024-11-19 NOTE — CONSULTS
REFERRING PHYSICIAN: Matt Clay MD.     CONSULTING PHYSICIAN: Rich Gaines DO     CHIEF COMPLAINT: Chest pain    HISTORY OF PRESENT ILLNESS: The patient is a 74 y.o. male with history of diabetes mellitus, hypertension, hyperlipidemia and coronary artery disease. He presented to the ED in [place] with chest pain for a couple of days. He was found to have a STEMI and was transferred to [facility] for a higher level of care. He was taken to cath lab and received stent to the culprit vessel the RCA. He had residual disease in the LAD and circumflex artery. Today he states he is chest pain free, He is not interested on having open heart surgery. He feels he does not have any social support to help him after surgery.    PAST MEDICAL HISTORY:   Hypertension  Hyperlipidemia  Diabetes mellitus- insulin dependant    PAST SURGICAL HISTORY:   None- reviewed     ALLERGIES: No Known Allergies     CURRENT MEDICATIONS:   Current Facility-Administered Medications:     atorvastatin (Lipitor) tablet 80 mg, 80 mg, Oral, Q EVENING, Flip Conn M.D.    metoprolol SR (Toprol XL) tablet 12.5 mg, 12.5 mg, Oral, Q DAY, Jesus Alberto Jauregui D.O., 12.5 mg at 11/19/24 0449    insulin lispro (HumaLOG,AdmeLOG) subcutaneous injection, 2-9 Units, Subcutaneous, 4X/DAY ACHS, 2 Units at 11/18/24 2054 **AND** POC blood glucose manual result, , , Q AC AND BEDTIME(S) **AND** NOTIFY MD and PharmD, , , Once **AND** Administer 20 grams of glucose (approximately 8 ounces of fruit juice) every 15 minutes PRN FSBG less than 70 mg/dL, , , PRN **AND** dextrose 50% (D50W) injection 25 g, 25 g, Intravenous, Q15 MIN PRN, Landon Bhatia M.D.    acetaminophen (Tylenol) tablet 650 mg, 650 mg, Oral, Q6HRS PRN, Chintan Martino M.D., 650 mg at 11/17/24 1833    aspirin EC tablet 81 mg, 81 mg, Oral, DAILY, Chintan Martino M.D., 81 mg at 11/19/24 0449    clopidogrel (Plavix) tablet 75 mg, 75 mg, Oral, DAILY, Chintan Martino M.D.,  75 mg at 11/19/24 0449    FAMILY HISTORY:   No family history of premature coronary artery disease.     SOCIAL HISTORY:   Social History     Socioeconomic History    Marital status: Not on file     Spouse name: Not on file    Number of children: Not on file    Years of education: Not on file    Highest education level: Not on file   Occupational History    Not on file   Tobacco Use    Smoking status: Former     Types: Cigarettes    Smokeless tobacco: Current     Types: Chew   Vaping Use    Vaping status: Never Used   Substance and Sexual Activity    Alcohol use: Not on file    Drug use: Not on file    Sexual activity: Not on file   Other Topics Concern    Not on file   Social History Narrative    Not on file     Social Drivers of Health     Financial Resource Strain: Not on file   Food Insecurity: Food Insecurity Present (11/16/2024)    Hunger Vital Sign     Worried About Running Out of Food in the Last Year: Sometimes true     Ran Out of Food in the Last Year: Never true   Transportation Needs: No Transportation Needs (11/16/2024)    PRAPARE - Transportation     Lack of Transportation (Medical): No     Lack of Transportation (Non-Medical): No   Physical Activity: Not on file   Stress: Not on file   Social Connections: Not on file   Intimate Partner Violence: Not At Risk (11/16/2024)    Humiliation, Afraid, Rape, and Kick questionnaire     Fear of Current or Ex-Partner: No     Emotionally Abused: No     Physically Abused: No     Sexually Abused: No   Housing Stability: Low Risk  (11/16/2024)    Housing Stability Vital Sign     Unable to Pay for Housing in the Last Year: No     Number of Times Moved in the Last Year: 0     Homeless in the Last Year: No     REVIEW OF SYSTEMS:  Review of Systems   Constitutional: Negative.    HENT: Negative.     Eyes: Negative.    Respiratory: Negative.     Cardiovascular: Negative.    Gastrointestinal: Negative.    Genitourinary: Negative.    Musculoskeletal: Negative.    Skin:  "Negative.    Neurological: Negative.    Endo/Heme/Allergies: Negative.    Psychiatric/Behavioral: Negative.       PHYSICAL EXAMINATION:    /60   Pulse 73   Temp 36.5 °C (97.7 °F) (Temporal)   Resp 18   Ht 1.803 m (5' 11\")   Wt 76.3 kg (168 lb 3.4 oz)   SpO2 93%   BMI 23.46 kg/m²    Physical Exam  Constitutional:       General: He is not in acute distress.     Comments: frail   HENT:      Head: Normocephalic and atraumatic.      Right Ear: Hearing normal.      Left Ear: Hearing normal.      Nose: Nose normal.      Mouth/Throat:      Dentition: Normal dentition.      Pharynx: Uvula midline.   Eyes:      Conjunctiva/sclera: Conjunctivae normal.      Pupils: Pupils are equal, round, and reactive to light.   Neck:      Thyroid: No thyromegaly.      Vascular: No JVD.      Trachea: Trachea normal.   Cardiovascular:      Rate and Rhythm: Normal rate and regular rhythm.      Heart sounds: Normal heart sounds.   Pulmonary:      Effort: Pulmonary effort is normal.      Breath sounds: Normal breath sounds.   Abdominal:      General: Bowel sounds are normal.      Palpations: Abdomen is soft.      Tenderness: There is no abdominal tenderness.   Musculoskeletal:         General: Normal range of motion.   Skin:     General: Skin is warm and dry.   Neurological:      Mental Status: He is alert and oriented to person, place, and time.      Sensory: Sensation is intact.      Motor: Motor function is intact.   Psychiatric:         Mood and Affect: Affect normal.         Cognition and Memory: Memory normal.      Comments: anxious       LABS REVIEWED:  Lab Results   Component Value Date/Time    SODIUM 139 11/19/2024 04:01 AM    POTASSIUM 4.0 11/19/2024 04:01 AM    CHLORIDE 105 11/19/2024 04:01 AM    CO2 23 11/19/2024 04:01 AM    GLUCOSE 162 (H) 11/19/2024 04:01 AM    BUN 39 (H) 11/19/2024 04:01 AM    CREATININE 1.69 (H) 11/19/2024 04:01 AM      No results found for: \"PROTHROMBTM\", \"INR\"   Lab Results   Component Value " Date/Time    WBC 8.9 11/19/2024 04:01 AM    RBC 3.57 (L) 11/19/2024 04:01 AM    HEMOGLOBIN 10.8 (L) 11/19/2024 04:01 AM    HEMATOCRIT 34.0 (L) 11/19/2024 04:01 AM    MCV 95.2 11/19/2024 04:01 AM    MCH 30.3 11/19/2024 04:01 AM    MCHC 31.8 (L) 11/19/2024 04:01 AM    MPV 10.2 11/19/2024 04:01 AM    NEUTSPOLYS 75.90 (H) 11/19/2024 04:01 AM    LYMPHOCYTES 12.60 (L) 11/19/2024 04:01 AM    MONOCYTES 9.60 11/19/2024 04:01 AM    EOSINOPHILS 1.00 11/19/2024 04:01 AM    BASOPHILS 0.30 11/19/2024 04:01 AM        IMAGING REVIEWED AND INTERPRETED:    ECHOCARDIOGRAM [facility] 11/17/2024:  Mildly challenging study.   Normal left and right ventricular systolic function.  Visually   estimated LVEF of 55%.  Mild basal inferior wall hypokinesis.   No significant valvular stenosis or regurgitation.   When compared to prior echo of 5/10/22, mild basal inferior wall   hypokinesis now present.    CARDIAC CATHETERIZATION  11/16/2024:  POSTOPERATIVE DIAGNOSIS:  99% thrombotic culprit proximal and mid RCA stenosis, JUNIOR I flow  70% focal mid LAD stenosis  70 to 80% focal proximal LCx stenosis  LVEF 75%, LVEDP 12 mmHg  Successful emergency PCI culprit proximal and mid RCA (3.25 x 48 mm Synergy AMARA), excellent result     RECOMMENDATIONS:  Guideline directed medical therapy   Cardiovascular Risk factor modification  Dual antiplatelet therapy for minimum 1 year  Outpatient staged PCI to LAD and circumflex in 6 to 12 weeks versus consideration for interval CABG. Given his low syntax score and preserved EF despite diabetes he would derive limited excessive benefit from CABG versus staged PCI.  Expressly recommend against inpatient staged intervention.    IMPRESSION:  STEMI s/p stent of RCA, residual MVCAD, Hypertension, DM      PLAN:  I recommend staged PCI. The patient does not want open heart surgery. Furthermore, he is a poor candidate from a physical standpoint and social support .   The procedure, its risks, benefits, potential complications and  alternative treatments were discussed with the patient in detail including the risks should he decide not to undergo my recommended treatment. All of his questions were answered to his satisfaction and he is willing to proceed with the operation. The risks include death, stroke, infection: to include a rare bacterial infection related to the use of the heart/lung machine, you-operative myocardial infarction, dysrhythmias, diaphragmatic paralysis, chest wall paresthesia, tracheostomy, kidney or other organ failure, possible return to the operating room for bleeding, bleeding requiring transfusion with its attendant risks including AIDS or hepatitis, dehiscence of surgical incisions, respiratory complications including the need for prolonged ventilator support, Protamine or other drug reaction, peripheral neuropathy, loss of limb, and miscount of surgical items. The operative mortality risk is approximately 5%. The STS mortality risk score is 3.4% and the morbidity and mortality risk score is 21% for urgent CABG. The scores were discussed with patient.    Thank you for this very challenging consultation and participation in the patient’s care.  I will keep you apprised of all future developments.            Sincerely,       Rich Gaines DO.        I,  Rich Gaines DO performed a substantial portion of the service face-to-face with the same patient on the same date of service. I have reviewed and agree with the care plan and complexity of problems documented by me, Rich Gaines DO and/or KENNY Fernandez. I was personally involved in the medical decision making, including the information below:  reviewing and interpreting the films, conducted elements of the history and physical exam, and provided the plan of care. All medical decision making was made by me.       Please note that this dictation was created using voice recognition software. The accuracy of the dictation is limited to the  abilities of the software. I have made every reasonable attempt to correct obvious errors, but I expect that there are errors of grammar and possibly content that I did not discover before finalizing the note.

## 2024-11-19 NOTE — CARE PLAN
The patient is Stable - Low risk of patient condition declining or worsening    Shift Goals  Clinical Goals: Hemodynamic stability, monitor CP, neuro checks  Patient Goals: sleep, go home  Family Goals: na    Progress made toward(s) clinical / shift goals:    Problem: Knowledge Deficit - Standard  Goal: Patient and family/care givers will demonstrate understanding of plan of care, disease process/condition, diagnostic tests and medications  Outcome: Progressing     Problem: Safety  Goal: Free from accidental injury  Outcome: Progressing     Problem: Hemodynamic Status  Goal: Stable Vital Signs and Fluid Balance  Outcome: Progressing       Patient is not progressing towards the following goals:

## 2024-11-19 NOTE — PROGRESS NOTES
Bedside report received. Patient transported to T8 via chris, RN, and monitor, on 1 L O2. Upon arrival patient A&Ox 4 . VS obtained and hooked up to tele monitor, monitors notified. All bedside belongings accounted for. Pt educated regarding medications, current POC, safety, and diet. Fall precautions in place, call light within reach.   Neuro Assessment stable and intact

## 2024-11-19 NOTE — PROGRESS NOTES
"Report called to GIAN Alvarez on T8.  On waking patient to advise of transfer, patient c/o 5/10 central chest pain \"similar\" to pain he experienced on arrival to hospital, stating it was intermittent aching.  VSS. Stat EKG ordered and completed.  Dr. Sher notified and reviewed EKG and ordered one time dose of pain relief, okay to transfer patient to T8.  Updates provided to GIAN Alvarez. Analgesia given prior to transport.  Patient transported to T809 on bed, monitored.    "

## 2024-11-19 NOTE — PROGRESS NOTES
Hospital Medicine Daily Progress Note    Date of Service  11/19/2024    Chief Complaint  Rancho He is a 74 y.o. male admitted 11/16/2024 with CP    Hospital Course    74 y.o. male who presented 11/16/2024 with a history of DM, HTN, HLD who presented to the ED in [facility] with CP.  He was found to have ST elevation in inferior leads and was given TNK prior to being sent to us.  Here Trop 13k and taken to the cath lab where a 99% proximal culprit RCA lesion was treated with PCI/AMARA.  He was also found to have LAD and LCx disease.  Patient was evaluated by thoracic surgeon and after discussion with the patient, holding on surgery, cardiology recommended to follow-up for high risk PCI as outpatient.    Interval Problem Update  -Evaluated examined the patient at bedside, patient has a pleuritic chest pain, denied any fever or chills, no lower extremity edema however patient has some crackles.  -Patient is on room air, reevaluate him with chest x-ray tomorrow  -After discussion with cardiology and thoracic surgeon, holding on procedure PCI or open heart surgery at this time, continue treatment with medications.        I have discussed this patient's plan of care and discharge plan at IDT rounds today with Case Management, Nursing, Nursing leadership, and other members of the IDT team.    Consultants/Specialty  cardiology    Code Status  Full Code    Disposition  The patient is not medically cleared for discharge to home or a post-acute facility.  Anticipate discharge to: skilled nursing facility    I have placed the appropriate orders for post-discharge needs.    Review of Systems  Review of Systems   Constitutional:  Negative for chills, fever and weight loss.   HENT:  Negative for ear pain, hearing loss and tinnitus.    Eyes:  Negative for blurred vision, double vision and photophobia.   Respiratory:  Negative for cough and hemoptysis.    Cardiovascular:  Positive for chest pain. Negative for palpitations,  orthopnea and claudication.   Gastrointestinal:  Negative for abdominal pain, constipation, diarrhea, nausea and vomiting.   Genitourinary:  Negative for dysuria, frequency and urgency.   Musculoskeletal:  Negative for myalgias and neck pain.   Skin:  Negative for rash.   Neurological:  Negative for dizziness, speech change and weakness.        Physical Exam  Temp:  [36.4 °C (97.6 °F)-36.9 °C (98.4 °F)] 36.4 °C (97.6 °F)  Pulse:  [65-84] 83  Resp:  [17-42] 18  BP: ()/(54-71) 108/70  SpO2:  [90 %-95 %] 90 %    Physical Exam  Constitutional:       General: He is not in acute distress.     Appearance: He is well-developed. He is not ill-appearing or diaphoretic.   HENT:      Head: Normocephalic and atraumatic.   Eyes:      General: No scleral icterus.     Conjunctiva/sclera: Conjunctivae normal.   Neck:      Vascular: No JVD.   Cardiovascular:      Rate and Rhythm: Normal rate.      Heart sounds: No murmur heard.     No gallop.   Pulmonary:      Effort: Pulmonary effort is normal. No respiratory distress.      Breath sounds: No stridor. No wheezing or rales.   Abdominal:      General: There is no distension.      Palpations: Abdomen is soft.      Tenderness: There is no abdominal tenderness. There is no right CVA tenderness, left CVA tenderness, guarding or rebound.   Musculoskeletal:      Right lower leg: No edema.      Left lower leg: No edema.   Lymphadenopathy:      Cervical: No cervical adenopathy.   Skin:     General: Skin is warm and dry.      Capillary Refill: Capillary refill takes less than 2 seconds.      Coloration: Skin is not jaundiced.      Findings: No bruising, lesion or rash.   Neurological:      General: No focal deficit present.      Mental Status: He is alert and oriented to person, place, and time. Mental status is at baseline.      Cranial Nerves: No cranial nerve deficit.      Motor: No weakness.      Gait: Gait normal.      Comments: tremor   Psychiatric:         Thought Content: Thought  content normal.         Fluids    Intake/Output Summary (Last 24 hours) at 11/19/2024 1534  Last data filed at 11/19/2024 1200  Gross per 24 hour   Intake 1280 ml   Output 900 ml   Net 380 ml        Laboratory  Recent Labs     11/17/24  0130 11/18/24  0509 11/19/24  0401   WBC 13.0* 9.6 8.9   RBC 3.76* 3.44* 3.57*   HEMOGLOBIN 11.7* 11.0* 10.8*   HEMATOCRIT 36.5* 33.0* 34.0*   MCV 97.1 95.9 95.2   MCH 31.1 32.0 30.3   MCHC 32.1* 33.3 31.8*   RDW 49.1 48.6 47.0   PLATELETCT 178 169 211   MPV 10.1 10.6 10.2     Recent Labs     11/17/24  0517 11/18/24  0509 11/19/24  0401   SODIUM 137 139 139   POTASSIUM 4.4 4.1 4.0   CHLORIDE 106 106 105   CO2 19* 21 23   GLUCOSE 198* 150* 162*   BUN 36* 41* 39*   CREATININE 2.17* 2.24* 1.69*   CALCIUM 8.2* 8.1* 8.2*             Recent Labs     11/17/24  0517   TRIGLYCERIDE 75   HDL 40   LDL 80       Imaging  US-RENAL   Final Result      1.  No hydronephrosis bilaterally.   2.  A 5 mm hyperechoic focus in the right renal cortex. Cortical calcification is favored over nonobstructing stone.      EC-ECHOCARDIOGRAM COMPLETE W/O CONT   Final Result      DX-CHEST-PORTABLE (1 VIEW)   Final Result      No acute cardiopulmonary abnormality.      DX-OUTSIDE IMAGES-DX CHEST   Final Result      CL-LEFT HEART CATHETERIZATION WITH POSSIBLE INTERVENTION    (Results Pending)   CL-LEFT HEART CATHETERIZATION WITH POSSIBLE INTERVENTION    (Results Pending)   CL-LEFT HEART CATHETERIZATION WITH POSSIBLE INTERVENTION    (Results Pending)        Assessment/Plan  * ST elevation myocardial infarction involving right coronary artery (HCC)  Assessment & Plan  Inf STEMI s/p AMARA to RCA  Residual LAD and LCx disease: Cards planning staged LHC as outpt  DAPT: ASA plavix  High dose statin  Metoprolol 12.5 mg BID; pressures borderline so will decrease to 12.5mg of the SR preparation daily  A1c 7.0  LDL 80  Cont Tele  TTE LVEF 55% with wall motion abnormalities  Cards following, PCI as outpatient  Add SGLT2i to outpt  regimen    Coronary artery disease  Assessment & Plan  Complex coronary artery disease  Stent was placed  Needs PCI as outpatient  Aspirin, Plavix, metoprolol and statin  Not qualified for ACE inhibitors or spironolactone due to elevated creatinine    LAUREL (acute kidney injury) (HCC)  Assessment & Plan  Unknown baseline creat  Improved s/p revascularization  ?cardiorenalsyndrome vs undiagnosed CKD: will need follow up  Daily BMP  Good UOP  Renally dose medications as appropriate  Renal US neg for hydro  Likely chronic    Normocytic normochromic anemia  Assessment & Plan  Check stool guaiac    Type 2 diabetes mellitus (HCC)  Assessment & Plan  A1c 7.0  On no meds as outpt  Good candidate for an SGLT2i +/- metformin given new ischemic cardiomyopathy  SSI  Carb consistent      Primary hypertension  Assessment & Plan  On no meds as outpt  Continue metoprolol  Cont to titrate as pressures dictate         VTE prophylaxis:   SCDs/TEDs   heparin ppx      I have performed a physical exam and reviewed and updated ROS and Plan today (11/19/2024). In review of yesterday's note (11/18/2024), there are no changes except as documented above.    Patient is has a high medical complexity, complex decision making and is at high risk for complication, morbidity, and mortality.  I spent 65 minutes, reviewing the chart, obtaining and/or reviewing separately obtained history. Performing a medically appropriate examination and evaluation.  Counseling and educating the patient. Ordering and reviewing medications, tests, or procedures.   Documenting clinical information in EPIC. Independently interpreting results and communicating results to patient. Discussing future disposition of care with patient, RN and case management.

## 2024-11-19 NOTE — PROGRESS NOTES
4 Eyes Skin Assessment Completed by GIAN Alvarez and KARLO Wood-A.    Head WDL  Ears Redness and Blanching  Nose WDL  Mouth WDL  Neck WDL  Breast/Chest WDL  Shoulder Blades WDL  Spine WDL  (R) Arm/Elbow/Hand Bruising, R radial site  (L) Arm/Elbow/Hand Bruising  Abdomen WDL  Groin WDL  Scrotum/Coccyx/Buttocks WDL  (R) Leg WDL  (L) Leg WDL  (R) Heel/Foot/Toe WDL  (L) Heel/Foot/Toe WDL          Devices In Places Tele Box, Blood Pressure Cuff, Pulse Ox, and Nasal Cannula      Interventions In Place NC W/Ear Foams, Sacral Mepilex, Pillows, and Heels Loaded W/Pillows    Possible Skin Injury No    Pictures Uploaded Into Epic N/A  Wound Consult Placed N/A  RN Wound Prevention Protocol Ordered No

## 2024-11-19 NOTE — PROGRESS NOTES
Cardiology Follow Up Progress Note    Date of Service  11/19/2024    Attending Physician  Flip Conn M.D.    Chief Complaint   STEMI with acute inferior myocardial infarction, failed TNK, status post percutaneous intervention with stent placement.     HPI  Rancho He is a 74 y.o. male admitted 11/16/2024 with above.    Interim Events  He suffered chest pain last night.     Review of Systems  Review of Systems   Constitutional: Negative.  Negative for chills and fever.   HENT: Negative.  Negative for hearing loss.    Eyes: Negative.    Respiratory: Negative.  Negative for cough and shortness of breath.    Cardiovascular: Negative.  Negative for chest pain, palpitations and leg swelling.   Gastrointestinal: Negative.  Negative for abdominal pain, nausea and vomiting.   Genitourinary: Negative.  Negative for dysuria and urgency.   Musculoskeletal: Negative.  Negative for myalgias.   Skin: Negative.  Negative for rash.   Neurological: Negative.  Negative for dizziness, weakness and headaches.   Hematological:  Does not bruise/bleed easily.   Psychiatric/Behavioral: Negative.  The patient is not nervous/anxious.        Vital signs in last 24 hours  Temp:  [36.5 °C (97.7 °F)-37 °C (98.6 °F)] 36.5 °C (97.7 °F)  Pulse:  [60-84] 73  Resp:  [16-42] 18  BP: ()/(51-71) 101/60  SpO2:  [89 %-95 %] 93 %    Physical Exam  Physical Exam  Constitutional:       Appearance: Normal appearance. He is well-developed and normal weight.   HENT:      Head: Normocephalic and atraumatic.      Mouth/Throat:      Mouth: Mucous membranes are moist.   Eyes:      Extraocular Movements: Extraocular movements intact.      Conjunctiva/sclera: Conjunctivae normal.   Cardiovascular:      Rate and Rhythm: Normal rate and regular rhythm.      Pulses: Normal pulses.      Heart sounds: Normal heart sounds.   Pulmonary:      Effort: Pulmonary effort is normal.      Breath sounds: Normal breath sounds.   Abdominal:      General: Bowel  "sounds are normal.      Palpations: Abdomen is soft.   Musculoskeletal:         General: Normal range of motion.      Cervical back: Normal range of motion and neck supple.   Skin:     General: Skin is warm and dry.   Neurological:      General: No focal deficit present.      Mental Status: He is alert and oriented to person, place, and time. Mental status is at baseline.   Psychiatric:         Mood and Affect: Mood normal.         Behavior: Behavior normal.         Thought Content: Thought content normal.         Judgment: Judgment normal.         Lab Review  Lab Results   Component Value Date/Time    WBC 8.9 11/19/2024 04:01 AM    RBC 3.57 (L) 11/19/2024 04:01 AM    HEMOGLOBIN 10.8 (L) 11/19/2024 04:01 AM    HEMATOCRIT 34.0 (L) 11/19/2024 04:01 AM    MCV 95.2 11/19/2024 04:01 AM    MCH 30.3 11/19/2024 04:01 AM    MCHC 31.8 (L) 11/19/2024 04:01 AM    MPV 10.2 11/19/2024 04:01 AM      Lab Results   Component Value Date/Time    SODIUM 139 11/19/2024 04:01 AM    POTASSIUM 4.0 11/19/2024 04:01 AM    CHLORIDE 105 11/19/2024 04:01 AM    CO2 23 11/19/2024 04:01 AM    GLUCOSE 162 (H) 11/19/2024 04:01 AM    BUN 39 (H) 11/19/2024 04:01 AM    CREATININE 1.69 (H) 11/19/2024 04:01 AM      No results found for: \"ASTSGOT\", \"ALTSGPT\"  Lab Results   Component Value Date/Time    CHOLSTRLTOT 135 11/17/2024 05:17 AM    LDL 80 11/17/2024 05:17 AM    HDL 40 11/17/2024 05:17 AM    TRIGLYCERIDE 75 11/17/2024 05:17 AM       No results for input(s): \"NTPROBNP\" in the last 72 hours.  (Above labs reviewed.)       Current Facility-Administered Medications:     metoprolol SR (Toprol XL) tablet 12.5 mg, 12.5 mg, Oral, Q DAY, Jesus Alberto Jauregui DVINH., 12.5 mg at 11/19/24 0449    MD Alert...ICU Electrolyte Replacement per Pharmacy, , Other, PHARMACY TO DOSE, Landon Bhatia M.D.    insulin lispro (HumaLOG,AdmeLOG) subcutaneous injection, 2-9 Units, Subcutaneous, 4X/DAY ACHS, 2 Units at 11/18/24 2054 **AND** POC blood glucose manual " result, , , Q AC AND BEDTIME(S) **AND** NOTIFY MD and PharmD, , , Once **AND** Administer 20 grams of glucose (approximately 8 ounces of fruit juice) every 15 minutes PRN FSBG less than 70 mg/dL, , , PRN **AND** dextrose 50% (D50W) injection 25 g, 25 g, Intravenous, Q15 MIN PRN, Landon Bhatia M.D.    atorvastatin (Lipitor) tablet 40 mg, 40 mg, Oral, Q EVENING, Landon Bhatia M.D., 40 mg at 11/18/24 1736    acetaminophen (Tylenol) tablet 650 mg, 650 mg, Oral, Q6HRS PRN, Chintan Martino M.D., 650 mg at 11/17/24 1833    aspirin EC tablet 81 mg, 81 mg, Oral, DAILY, Chintan Martino M.D., 81 mg at 11/19/24 0449    clopidogrel (Plavix) tablet 75 mg, 75 mg, Oral, DAILY, Chintan Martino M.D., 75 mg at 11/19/24 0449  (Medications reviewed.)    Cardiac Imaging and Procedures Review  CARDIAC STUDIES/PROCEDURES:    CARDIAC CATHETERIZATION CONCLUSIONS by Chintan Shelton (11/16/24)  99% thrombotic culprit proximal and mid RCA stenosis, JUNIOR I flow  70% focal mid LAD stenosis  70 to 80% focal proximal LCx stenosis  LVEF 75%, LVEDP 12 mmHg  Successful emergency PCI culprit proximal and mid RCA (3.25 x 48 mm Synergy AMARA), excellent result  (study result reviewed)     ECHOCARDIOGRAM CONCLUSIONS (11/17/24)  Mildly challenging study.   Normal left and right ventricular systolic function.  Visually   estimated LVEF of 55%.  Mild basal inferior wall hypokinesis.   No significant valvular stenosis or regurgitation.   When compared to prior echo of 5/10/22, mild basal inferior wall   hypokinesis now present.  (study result reviewed)     EKG performed on (11/19/24) was reviewed: EKG personally interpreted shows sinus rhythm.     Assessment/Plan  STEMI with acute inferior myocardial infarction and failed TNK, multivessel coronary artery disease, stented coronary artery: He is pending cardiothoracic surgery consult. He will be planned for further percutaneous intervention if he is not a surgical  candidate.  Hypertension and hyperlipidemia: Stable on medical therapy.    Thank you for allowing me to participate in the care of this patient.  I will continue to follow this patient    Please contact me with any questions.    Tex Hoffman M.D.   Cardiologist, [facility]  [phone]

## 2024-11-19 NOTE — PROGRESS NOTES
Patient seen. Patient refuses open heart surgery. He would like to proceed with PCI. Full consult to follow.

## 2024-11-19 NOTE — THERAPY
Physical Therapy Contact Note    PT cardiac rehab consult received and acknowledged. Discussed with RN; patient may return to cath lab today. EMR indicates residual L circumflex and LAD disease and possible CTS consult. Will initiate phase I cardiac rehab as able and appropriate.     Isabelle Nicolas, PT, DPT  [phone]

## 2024-11-19 NOTE — DISCHARGE PLANNING
Case Management Discharge Planning    Admission Date: 11/16/2024  GMLOS: 2  ALOS: 3    6-Clicks ADL Score: 24  6-Clicks Mobility Score: 23    Anticipated Discharge Dispo: Discharge Disposition: Discharged to home/self care (01)  Discharge Address: [address] [facility] [address]    DME Needed: No    Action(s) Taken: DC Assessment Complete (See below)    RNCM met with patient at bedside to complete assessment and discuss discharge planning. Patient is alert and oriented X4 and able to verify demographics.     Patient reports he lives with his oldest granddaughter Ashlyn in a senior apartment located in [facility]. At baseline, he uses a walking cane, drives self and is independent with ADLs/IADLs.     Patient's PCP is [facility]; preferred pharmacy is [facility] located in [facility]. Insurance coverage confirmed via Medicare and Medicaid FFS. Patient has income from [employer]. Pt has the support of her daughter Scout.     Patient denied smoking but chew tobacco; he does not drink alcohol or use recreational drugs. Pt denied SA/MH concerns.    RNCM discussed discharge planning; pt's goal is to return home when medically cleared. Patient's daughter Scout will provide transport at discharge.     Escalations Completed: None    Medically Clear: No    Next Steps: F/U with medical team regarding D/C needs/ barriers.     Barriers to Discharge: Medical clearance    Is the patient up for discharge tomorrow: No       Care Transition Team Assessment    Information Source  Orientation Level: Oriented X4  Information Given By: Patient  Informant's Name: Rancho He  Who is responsible for making decisions for patient? : Patient    Readmission Evaluation  Is this a readmission?: No    Elopement Risk  Legal Hold: No  Ambulatory or Self Mobile in Wheelchair: Yes  Disoriented: No  Psychiatric Symptoms: None  History of Wandering: No  Elopement this Admit: No  Vocalizing Wanting to Leave: No  Displays Behaviors,  Body Language Wanting to Leave: No-Not at Risk for Elopement  Elopement Risk: Not at Risk for Elopement    Interdisciplinary Discharge Planning  Lives with - Patient's Self Care Capacity: Related Adult  Patient or legal guardian wants to designate a caregiver: No  Support Systems: Family Member(s)  Housing / Facility: 1 Story Apartment / Condo    Discharge Preparedness  What is your plan after discharge?: Home with help  What are your discharge supports?: Child (Grand daughter)  Prior Functional Level: Ambulatory, Drives Self, Uses Cane, Independent with Activities of Daily Living, Independent with Medication Management    Functional Assesment  Prior Functional Level: Ambulatory, Drives Self, Uses Cane, Independent with Activities of Daily Living, Independent with Medication Management    Finances  Financial Barriers to Discharge: No  Prescription Coverage: Yes    Vision / Hearing Impairment  Right Eye Vision: Impaired, Wears Glasses  Left Eye Vision: Impaired, Wears Glasses  Hearing Impairment : No    Advance Directive  Advance Directive?: None    Domestic Abuse  Possible Abuse/Neglect Reported to:: Not Applicable    Psychological Assessment  History of Substance Abuse: None  History of Psychiatric Problems: No    Discharge Risks or Barriers  Discharge risks or barriers?: Complex medical needs  Patient risk factors: Complex medical needs    Anticipated Discharge Information  Discharge Disposition: Discharged to home/self care (01)  Discharge Address: [address] [place] [id number]

## 2024-11-19 NOTE — ASSESSMENT & PLAN NOTE
Complex coronary artery disease  Stent was placed  Needs PCI as outpatient  Aspirin, Plavix, metoprolol and statin  Not qualified for ACE inhibitors or spironolactone due to elevated creatinine

## 2024-11-20 ENCOUNTER — PATIENT OUTREACH (OUTPATIENT)
Dept: SCHEDULING | Facility: IMAGING CENTER | Age: 74
End: 2024-11-20
Payer: MEDICARE

## 2024-11-20 ENCOUNTER — PHARMACY VISIT (OUTPATIENT)
Dept: PHARMACY | Facility: MEDICAL CENTER | Age: 74
End: 2024-11-20
Payer: COMMERCIAL

## 2024-11-20 ENCOUNTER — TELEPHONE (OUTPATIENT)
Dept: CARDIOLOGY | Facility: MEDICAL CENTER | Age: 74
End: 2024-11-20
Payer: MEDICARE

## 2024-11-20 ENCOUNTER — APPOINTMENT (OUTPATIENT)
Dept: RADIOLOGY | Facility: MEDICAL CENTER | Age: 74
DRG: 322 | End: 2024-11-20
Attending: INTERNAL MEDICINE
Payer: MEDICARE

## 2024-11-20 VITALS
OXYGEN SATURATION: 95 % | HEIGHT: 71 IN | WEIGHT: 167.99 LBS | BODY MASS INDEX: 23.52 KG/M2 | SYSTOLIC BLOOD PRESSURE: 106 MMHG | TEMPERATURE: 97.3 F | DIASTOLIC BLOOD PRESSURE: 62 MMHG | RESPIRATION RATE: 18 BRPM | HEART RATE: 74 BPM

## 2024-11-20 LAB
ALBUMIN SERPL BCP-MCNC: 2.8 G/DL (ref 3.2–4.9)
ALBUMIN/GLOB SERPL: 0.8 G/DL
ALP SERPL-CCNC: 57 U/L (ref 30–99)
ALT SERPL-CCNC: 10 U/L (ref 2–50)
ANION GAP SERPL CALC-SCNC: 10 MMOL/L (ref 7–16)
AST SERPL-CCNC: 16 U/L (ref 12–45)
BASOPHILS # BLD AUTO: 0.5 % (ref 0–1.8)
BASOPHILS # BLD: 0.04 K/UL (ref 0–0.12)
BILIRUB SERPL-MCNC: 0.6 MG/DL (ref 0.1–1.5)
BUN SERPL-MCNC: 29 MG/DL (ref 8–22)
CALCIUM ALBUM COR SERPL-MCNC: 9.6 MG/DL (ref 8.5–10.5)
CALCIUM SERPL-MCNC: 8.6 MG/DL (ref 8.5–10.5)
CHLORIDE SERPL-SCNC: 104 MMOL/L (ref 96–112)
CO2 SERPL-SCNC: 22 MMOL/L (ref 20–33)
CREAT SERPL-MCNC: 1.39 MG/DL (ref 0.5–1.4)
EOSINOPHIL # BLD AUTO: 0.11 K/UL (ref 0–0.51)
EOSINOPHIL NFR BLD: 1.3 % (ref 0–6.9)
ERYTHROCYTE [DISTWIDTH] IN BLOOD BY AUTOMATED COUNT: 45.6 FL (ref 35.9–50)
FERRITIN SERPL-MCNC: 294 NG/ML (ref 22–322)
GFR SERPLBLD CREATININE-BSD FMLA CKD-EPI: 53 ML/MIN/1.73 M 2
GLOBULIN SER CALC-MCNC: 3.3 G/DL (ref 1.9–3.5)
GLUCOSE BLD STRIP.AUTO-MCNC: 139 MG/DL (ref 65–99)
GLUCOSE BLD STRIP.AUTO-MCNC: 147 MG/DL (ref 65–99)
GLUCOSE SERPL-MCNC: 149 MG/DL (ref 65–99)
HCT VFR BLD AUTO: 32.9 % (ref 42–52)
HGB BLD-MCNC: 10.6 G/DL (ref 14–18)
IMM GRANULOCYTES # BLD AUTO: 0.03 K/UL (ref 0–0.11)
IMM GRANULOCYTES NFR BLD AUTO: 0.4 % (ref 0–0.9)
IRON SATN MFR SERPL: 6 % (ref 15–55)
IRON SERPL-MCNC: 12 UG/DL (ref 50–180)
LYMPHOCYTES # BLD AUTO: 1.15 K/UL (ref 1–4.8)
LYMPHOCYTES NFR BLD: 13.8 % (ref 22–41)
MCH RBC QN AUTO: 30.5 PG (ref 27–33)
MCHC RBC AUTO-ENTMCNC: 32.2 G/DL (ref 32.3–36.5)
MCV RBC AUTO: 94.5 FL (ref 81.4–97.8)
MONOCYTES # BLD AUTO: 1.07 K/UL (ref 0–0.85)
MONOCYTES NFR BLD AUTO: 12.9 % (ref 0–13.4)
NEUTROPHILS # BLD AUTO: 5.92 K/UL (ref 1.82–7.42)
NEUTROPHILS NFR BLD: 71.1 % (ref 44–72)
NRBC # BLD AUTO: 0 K/UL
NRBC BLD-RTO: 0 /100 WBC (ref 0–0.2)
PLATELET # BLD AUTO: 232 K/UL (ref 164–446)
PMV BLD AUTO: 9.8 FL (ref 9–12.9)
POTASSIUM SERPL-SCNC: 3.8 MMOL/L (ref 3.6–5.5)
PROT SERPL-MCNC: 6.1 G/DL (ref 6–8.2)
RBC # BLD AUTO: 3.48 M/UL (ref 4.7–6.1)
SODIUM SERPL-SCNC: 136 MMOL/L (ref 135–145)
TIBC SERPL-MCNC: 207 UG/DL (ref 250–450)
TSH SERPL DL<=0.005 MIU/L-ACNC: 1.56 UIU/ML (ref 0.38–5.33)
UIBC SERPL-MCNC: 195 UG/DL (ref 110–370)
WBC # BLD AUTO: 8.3 K/UL (ref 4.8–10.8)

## 2024-11-20 PROCEDURE — 83550 IRON BINDING TEST: CPT

## 2024-11-20 PROCEDURE — 700102 HCHG RX REV CODE 250 W/ 637 OVERRIDE(OP): Performed by: INTERNAL MEDICINE

## 2024-11-20 PROCEDURE — A9270 NON-COVERED ITEM OR SERVICE: HCPCS | Performed by: PHYSICIAN ASSISTANT

## 2024-11-20 PROCEDURE — 99239 HOSP IP/OBS DSCHRG MGMT >30: CPT | Performed by: INTERNAL MEDICINE

## 2024-11-20 PROCEDURE — RXMED WILLOW AMBULATORY MEDICATION CHARGE: Performed by: INTERNAL MEDICINE

## 2024-11-20 PROCEDURE — 99232 SBSQ HOSP IP/OBS MODERATE 35: CPT | Mod: FS | Performed by: INTERNAL MEDICINE

## 2024-11-20 PROCEDURE — 36415 COLL VENOUS BLD VENIPUNCTURE: CPT

## 2024-11-20 PROCEDURE — 82728 ASSAY OF FERRITIN: CPT

## 2024-11-20 PROCEDURE — A9270 NON-COVERED ITEM OR SERVICE: HCPCS | Performed by: INTERNAL MEDICINE

## 2024-11-20 PROCEDURE — 85025 COMPLETE CBC W/AUTO DIFF WBC: CPT

## 2024-11-20 PROCEDURE — 84443 ASSAY THYROID STIM HORMONE: CPT

## 2024-11-20 PROCEDURE — 71045 X-RAY EXAM CHEST 1 VIEW: CPT

## 2024-11-20 PROCEDURE — 97535 SELF CARE MNGMENT TRAINING: CPT

## 2024-11-20 PROCEDURE — 97162 PT EVAL MOD COMPLEX 30 MIN: CPT

## 2024-11-20 PROCEDURE — 80053 COMPREHEN METABOLIC PANEL: CPT

## 2024-11-20 PROCEDURE — 82962 GLUCOSE BLOOD TEST: CPT

## 2024-11-20 PROCEDURE — 700102 HCHG RX REV CODE 250 W/ 637 OVERRIDE(OP): Performed by: PHYSICIAN ASSISTANT

## 2024-11-20 PROCEDURE — 83540 ASSAY OF IRON: CPT

## 2024-11-20 RX ORDER — ATORVASTATIN CALCIUM 80 MG/1
80 TABLET, FILM COATED ORAL EVERY EVENING
Qty: 90 TABLET | Refills: 4 | Status: SHIPPED | OUTPATIENT
Start: 2024-11-20

## 2024-11-20 RX ORDER — ASPIRIN 81 MG/1
81 TABLET ORAL DAILY
Qty: 200 TABLET | Refills: 3 | Status: SHIPPED | OUTPATIENT
Start: 2024-11-21

## 2024-11-20 RX ORDER — CLOPIDOGREL BISULFATE 75 MG/1
75 TABLET ORAL DAILY
Qty: 90 TABLET | Refills: 4 | Status: SHIPPED | OUTPATIENT
Start: 2024-11-21

## 2024-11-20 RX ORDER — METOPROLOL SUCCINATE 25 MG/1
25 TABLET, EXTENDED RELEASE ORAL DAILY
Qty: 90 TABLET | Refills: 2 | Status: SHIPPED | OUTPATIENT
Start: 2024-11-21

## 2024-11-20 RX ADMIN — METOPROLOL SUCCINATE 25 MG: 25 TABLET, EXTENDED RELEASE ORAL at 05:15

## 2024-11-20 RX ADMIN — ASPIRIN 81 MG: 81 TABLET, COATED ORAL at 05:15

## 2024-11-20 RX ADMIN — CLOPIDOGREL BISULFATE 75 MG: 75 TABLET ORAL at 05:15

## 2024-11-20 ASSESSMENT — ENCOUNTER SYMPTOMS
FEVER: 0
COLOR CHANGE: 0
PALPITATIONS: 0
TROUBLE SWALLOWING: 0
NUMBNESS: 0
CONFUSION: 0
BLOOD IN STOOL: 0
COUGH: 0
AGITATION: 0
DIAPHORESIS: 0
ABDOMINAL DISTENTION: 0
DIZZINESS: 0
NERVOUS/ANXIOUS: 0
CHEST TIGHTNESS: 0
SHORTNESS OF BREATH: 0
ABDOMINAL PAIN: 0
CHILLS: 0

## 2024-11-20 ASSESSMENT — GAIT ASSESSMENTS
DEVIATION: BRADYKINETIC
GAIT LEVEL OF ASSIST: SUPERVISED
DISTANCE (FEET): 200
ASSISTIVE DEVICE: FRONT WHEEL WALKER

## 2024-11-20 ASSESSMENT — COGNITIVE AND FUNCTIONAL STATUS - GENERAL
MOBILITY SCORE: 23
SUGGESTED CMS G CODE MODIFIER MOBILITY: CI
CLIMB 3 TO 5 STEPS WITH RAILING: A LITTLE

## 2024-11-20 ASSESSMENT — PAIN DESCRIPTION - PAIN TYPE: TYPE: ACUTE PAIN

## 2024-11-20 ASSESSMENT — FIBROSIS 4 INDEX: FIB4 SCORE: 1.59

## 2024-11-20 NOTE — PROGRESS NOTES
Monitor Summary    Rhythm: SR  Rate: 80-87  Ectopy: pvc, pac  Measurements: .16 / .06 / .33

## 2024-11-20 NOTE — TELEPHONE ENCOUNTER
----- Message from Physician Assistant Nasrin Llamas P.A.-C. sent at 11/19/2024  3:55 PM PST -----  Regarding: RE: Need new epic order  Ok I think its ordered right now? I don't know how to DC the other one.  ----- Message -----  From: Gigi Acosta  Sent: 11/19/2024   3:50 PM PST  To: Nasrin Llamas P.A.-C.  Subject: RE: Need new epic order                          You didn't order the right one. I need the C w/staged PCI.TY  ----- Message -----  From: Nasrin Llamas P.A.-C.  Sent: 11/19/2024   3:39 PM PST  To: Gigi Acosta  Subject: RE: Need new epic order                          I thought I did order it. Does it not show? I put it under the future outpatient tab  ----- Message -----  From: Gigi Acosta  Sent: 11/19/2024   3:25 PM PST  To: Nasrin Llamas P.A.-C.  Subject: Need new epic order                              Can  you please order a [facility] w/staged PCI in EPIC.TY  ----- Message -----  From: Nasrin Llamas P.A.-C.  Sent: 11/19/2024   3:04 PM PST  To: Gigi Acosta; Matt Clay M.D.    Please schedule staged outpatient PCI in the next 4-6 weeks. Thank you.

## 2024-11-20 NOTE — CARE PLAN
The patient is Stable - Low risk of patient condition declining or worsening    Shift Goals  Clinical Goals: q4 neuro checks  Patient Goals: rest  Family Goals: adonis    Progress made toward(s) clinical / shift goals:      Problem: Knowledge Deficit - Standard  Goal: Patient and family/care givers will demonstrate understanding of plan of care, disease process/condition, diagnostic tests and medications  Outcome: Progressing     Problem: Fall Risk  Goal: Patient will remain free from falls  Outcome: Progressing       Patient is not progressing towards the following goals:

## 2024-11-20 NOTE — PROGRESS NOTES
Cardiology Follow Up Progress Note    Date of Service  11/20/2024    Attending Physician  Flip Conn M.D.    Chief Complaint   Chest pain     HPI  Rancho He is a 74 y.o. male admitted 11/16/2024 with  prior medical history significant for hypertension, diabetes mellitus, hyperlipidemia, former smoker who was transferred from [facility] on 11/16/2024 with inferior STEMI.  He received TNK and failed. Taken for emergent cath.     Interim Events  No event overnight   NSR   Denies any chest pain, sob, dizziness or palpitations  Hgb 10.6    Review of Systems  Review of Systems   Constitutional:  Negative for chills, diaphoresis and fever.   HENT:  Negative for nosebleeds and trouble swallowing.    Respiratory:  Negative for cough, chest tightness and shortness of breath.    Cardiovascular:  Negative for chest pain, palpitations and leg swelling.   Gastrointestinal:  Negative for abdominal distention, abdominal pain and blood in stool.   Genitourinary:  Negative for hematuria.   Skin:  Negative for color change.   Neurological:  Negative for dizziness, syncope and numbness.   Psychiatric/Behavioral:  Negative for agitation and confusion. The patient is not nervous/anxious.        Vital signs in last 24 hours  Temp:  [36.3 °C (97.3 °F)-36.6 °C (97.9 °F)] 36.6 °C (97.9 °F)  Pulse:  [80-90] 80  Resp:  [17-20] 20  BP: (107-127)/(69-74) 107/70  SpO2:  [90 %-93 %] 92 %    Physical Exam  Physical Exam  Vitals and nursing note reviewed.   Constitutional:       Appearance: Normal appearance.   HENT:      Head: Normocephalic and atraumatic.   Eyes:      Pupils: Pupils are equal, round, and reactive to light.   Cardiovascular:      Rate and Rhythm: Normal rate and regular rhythm.      Heart sounds: Normal heart sounds. No murmur heard.  Pulmonary:      Effort: Pulmonary effort is normal.      Breath sounds: Normal breath sounds.   Abdominal:      General: Abdomen is flat.   Musculoskeletal:      Cervical  "back: Normal range of motion.   Skin:     General: Skin is warm and dry.   Neurological:      General: No focal deficit present.      Mental Status: He is alert and oriented to person, place, and time.   Psychiatric:         Mood and Affect: Mood normal.         Behavior: Behavior normal.         Thought Content: Thought content normal.         Judgment: Judgment normal.         Lab Review  Lab Results   Component Value Date/Time    WBC 8.3 11/20/2024 02:14 AM    RBC 3.48 (L) 11/20/2024 02:14 AM    HEMOGLOBIN 10.6 (L) 11/20/2024 02:14 AM    HEMATOCRIT 32.9 (L) 11/20/2024 02:14 AM    MCV 94.5 11/20/2024 02:14 AM    MCH 30.5 11/20/2024 02:14 AM    MCHC 32.2 (L) 11/20/2024 02:14 AM    MPV 9.8 11/20/2024 02:14 AM      Lab Results   Component Value Date/Time    SODIUM 136 11/20/2024 02:14 AM    POTASSIUM 3.8 11/20/2024 02:14 AM    CHLORIDE 104 11/20/2024 02:14 AM    CO2 22 11/20/2024 02:14 AM    GLUCOSE 149 (H) 11/20/2024 02:14 AM    BUN 29 (H) 11/20/2024 02:14 AM    CREATININE 1.39 11/20/2024 02:14 AM      Lab Results   Component Value Date/Time    ASTSGOT 16 11/20/2024 02:14 AM    ALTSGPT 10 11/20/2024 02:14 AM     Lab Results   Component Value Date/Time    CHOLSTRLTOT 135 11/17/2024 05:17 AM    LDL 80 11/17/2024 05:17 AM    HDL 40 11/17/2024 05:17 AM    TRIGLYCERIDE 75 11/17/2024 05:17 AM       No results for input(s): \"NTPROBNP\" in the last 72 hours.    Cardiac Imaging and Procedures Review  Telemetry showed NSR     Echocardiogram:    11/17/2024  Normal left and right ventricular systolic function.  Visually   estimated LVEF of 55%.  Mild basal inferior wall hypokinesis.   No significant valvular stenosis or regurgitation.   When compared to prior echo of 5/10/22, mild basal inferior wall   hypokinesis now present.    Cardiac Catheterization:    11/16/2024  INDICATION/PREOPERATIVE DIAGNOSIS:  Inferior STEMI status post failed TNK  Tremor  Hypertension  Diabetes mellitus     POSTOPERATIVE DIAGNOSIS:  99% thrombotic " culprit proximal and mid RCA stenosis, JUNIOR I flow  70% focal mid LAD stenosis  70 to 80% focal proximal LCx stenosis  LVEF 75%, LVEDP 12 mmHg  Successful emergency PCI culprit proximal and mid RCA (3.25 x 48 mm Synergy AMARA), excellent result     RECOMMENDATIONS:  Guideline directed medical therapy   Cardiovascular Risk factor modification  Dual antiplatelet therapy for minimum 1 year  Outpatient staged PCI to LAD and circumflex in 6 to 12 weeks versus consideration for interval CABG. Given his low syntax score and preserved EF despite diabetes he would derive limited excessive benefit from CABG versus staged PCI.  Expressly recommend against inpatient staged intervention.    Assessment/Plan  No new Assessment & Plan notes have been filed under this hospital service since the last note was generated.  Service: Cardiology    Inferior MI   Failed TNK, cath showed emergency PCI of proximal and mid RCA   Known 70% 70% focal mid LAD stenosis  70 to 80% focal proximal LCx stenosis  - plan for staged PCI PCI to LAD and circumflex in 6 to 12 weeks   - CTS recommend staged PCI and patient does not wish for open heart surgery   - ECHo showed ef 55%  - continue asa, atorvastatin 80mg qd, plavix 75mg qd, and metoprolol XL 25mg qd     I personally spent a total of 19 minutes which includes face-to-face time and non-face-to-face time spent on preparing to see the patient, reviewing hospital notes and tests, obtaining history from the patient, performing a medically appropriate exam, counseling and educating the patient, ordering medications/tests/procedures/referrals as clinically indicated, and documenting information in the electronic medical record.    Future Appointments   Date Time Provider Department Center   12/13/2024  8:40 AM León Stephens M.D. [facility] None       Thank you for allowing me to participate in the care of this patient.  Cardiology will sign off on this patient    Please contact me with any  questions.    BRAD Walker.

## 2024-11-20 NOTE — FACE TO FACE
Face to Face Supporting Documentation - Home Health    The encounter with this patient was in whole or in part the primary reason for home health admission.    Date of encounter:   Patient:                    MRN:                       YOB: 2023  Rancho He  [id number]  1950     Home health to see patient for:  Skilled Nursing care for assessment, interventions & education, Physical Therapy evaluation and treatment, and Occupational therapy evaluation and treatment    Skilled need for:  Exacerbation of Chronic Disease State CAD    Skilled nursing interventions to include:  Venous access care and Line/Drain/Airway education and care    Homebound status evidenced by:  Need the aid of supportive devices such as crutches, canes, wheelchairs or walkers. Leaving home requires a considerable and taxing effort. There is a normal inability to leave the home.    Community Physician to provide follow up care: FEDERICO Kelly     Optional Interventions? No      I certify the face to face encounter for this home health care referral meets the CMS requirements and the encounter/clinical assessment with the patient was, in whole, or in part, for the medical condition(s) listed above, which is the primary reason for home health care. Based on my clinical findings: the service(s) are medically necessary, support the need for home health care, and the homebound criteria are met.  I certify that this patient has had a face to face encounter by myself.  Flip Conn M.D. - MAXI: [phone]

## 2024-11-20 NOTE — PROGRESS NOTES
Pt discharged with all belongings. Cardiac monitor removed. LDAs disconnected. Pt prescriptions given to patient at bedside. Pt wheeled out to car in wheelchair. All discharge paperwork discussed. Patient discharged home with daughter.

## 2024-11-20 NOTE — DISCHARGE SUMMARY
Discharge Summary    CHIEF COMPLAINT ON ADMISSION  No chief complaint on file.      Reason for Admission  stemi transfer   Coronary artery disease    Admission Date  11/16/2024    CODE STATUS  Full Code    HPI & HOSPITAL COURSE    74-year-old male with history of diabetes, hypertension and dyslipidemia who presented 11/16 from outside facility for chest pain.  Patient was found to have ST elevation in inferior leads  and was given TNK prior to being sent to us.  Troponin was elevated more than 1300 1 patient was rushed to cath lab where a 99% proximal culprit RCA lesion was treated with PCI/AMARA.  He was also found to have 70% focal mid LAD and 70 -80% LCx disease.  Patient was evaluated by thoracic surgeon and after discussion with the patient, holding on surgery, cardiology recommended to follow-up for PCI as outpatient.  Patient will be discharged on aspirin, Plavix, metoprolol and atorvastatin 80 mg daily, not able to provide ACE inhibitors or spironolactone due to kidney failure.    Unknown his baseline about kidney function however patient came with creatinine 2.2, improved during hospitalization and on the day of discharge the creatinine was 1.3, we will hold on ACE inhibitors or spironolactone at this time, nephrology referral was placed.    PT and OT evaluated the patient and recommended home health, on the day of discharge the patient was alert oriented x 4, patient was on room air, no significant chest pain, patient was cleared by cardiology team for discharge to continue medical management including aspirin, atorvastatin and Plavix with metoprolol, omeprazole was added to decrease risk of bleeding, plan of care was discussed in detail with the patient and with his daughter and niece, answered all her question, discussed the importance of taking the medication daily, close monitoring with the PCP and cardiologist, discussed the risk of recurrent acute coronary syndrome also risk of bleeding since he is on  antiplatelets, they understood and agreed.  The patient states he is going to live with his niece at this time.    Therefore, he is discharged in good and stable condition to home with organized home healthcare and close outpatient follow-up.    The patient met 2-midnight criteria for an inpatient stay at the time of discharge.    Discharge Date  11/20/24      FOLLOW UP ITEMS POST DISCHARGE  Follow-up with PCP  Follow-up with cardiology for coronary artery disease, patient has an appointment in December/13    DISCHARGE DIAGNOSES  Principal Problem:    ST elevation myocardial infarction involving right coronary artery (HCC) (POA: Unknown)  Active Problems:    Coronary artery disease (POA: Unknown)    Primary hypertension (POA: Unknown)    Type 2 diabetes mellitus (HCC) (POA: Unknown)    Tremor (POA: Unknown)    Normocytic normochromic anemia (POA: Unknown)    Bandemia (POA: Unknown)    LAUREL (acute kidney injury) (HCC) (POA: Unknown)  Resolved Problems:    * No resolved hospital problems. *      FOLLOW UP  Future Appointments   Date Time Provider Department Center   12/13/2024  8:40 AM León Stephens M.D. [facility]  [address]  [address]  [facility] [id number] [phone]  Call  Your doctor has referred you for Cardiac Rehab which is important in your recovery. Please call to make an appointment.    FEDERICO Kelly  [address]  [address] [facility] [address]  [phone]    Call in 1 week(s)  call to make a hospital follow up visit      MEDICATIONS ON DISCHARGE     Medication List        START taking these medications        Instructions   aspirin 81 MG EC tablet  Start taking on: November 21, 2024   Take 1 Tablet by mouth every day.  Dose: 81 mg     atorvastatin 80 MG tablet  Commonly known as: Lipitor   Take 1 Tablet by mouth every evening.  Dose: 80 mg     clopidogrel 75 MG Tabs  Start taking on: November 21, 2024  Commonly known as: Plavix   Take 1  Tablet by mouth every day.  Dose: 75 mg     metoprolol SR 25 MG Tb24  Start taking on: November 21, 2024  Commonly known as: Toprol XL   Take 1 Tablet by mouth every day.  Dose: 25 mg     omeprazole 20 MG delayed-release capsule  Commonly known as: PriLOSEC   Take 1 Capsule by mouth every day.  Dose: 20 mg              Allergies  No Known Allergies    DIET  Orders Placed This Encounter   Procedures    Diet Order Diet: Cardiac     Standing Status:   Standing     Number of Occurrences:   1     Order Specific Question:   Diet:     Answer:   Cardiac [6]       ACTIVITY  As tolerated.  Weight bearing as tolerated    CONSULTATIONS  Cardiology and thoracic surgeon    PROCEDURES  Cardiac cath    LABORATORY  Lab Results   Component Value Date    SODIUM 136 11/20/2024    POTASSIUM 3.8 11/20/2024    CHLORIDE 104 11/20/2024    CO2 22 11/20/2024    GLUCOSE 149 (H) 11/20/2024    BUN 29 (H) 11/20/2024    CREATININE 1.39 11/20/2024        Lab Results   Component Value Date    WBC 8.3 11/20/2024    HEMOGLOBIN 10.6 (L) 11/20/2024    HEMATOCRIT 32.9 (L) 11/20/2024    PLATELETCT 232 11/20/2024        Total time of the discharge process exceeds 35 minutes.

## 2024-11-20 NOTE — DISCHARGE INSTRUCTIONS
Diagnosis:  Acute Coronary Syndrome (ACS) is a diagnosis that encompasses cardiac-related chest pain and heart attack. ACS occurs when the blood flow to the heart muscle is severely reduced or cut off completely due to a slow process called atherosclerosis.  Atherosclerosis is a disease in which the coronary arteries become narrow from a buildup of fat, cholesterol, and other substances that combine to form plaque. If the plaque breaks, a blood clot will form and block the blood flow to the heart muscle. This lack of blood flow can cause damage or death to the heart muscle which is called a heart attack or Myocardial Infarction (MI). There are two different types of MIs:  ST Elevation Myocardial Infarction or STEMI (the most severe type of heart attack) and Non-ST Elevation Myocardial Infarction or NSTEMI.    Treatment Plan:  Cardiac Diet  - Low fat, low salt, low cholesterol   Cardiac Rehab  - Your doctor has ordered you a referral to [facility].  Call [phone] to schedule an appointment.  Attend my follow-up appointment with my Cardiologist.  Take my medications as prescribed by my doctor  Exercise daily  Quit Smoking, Lower my bad cholesterol and raise my good cholesterol, lower my blood pressure, Reduce stress, and Control my diabetes    Medications:  Certain medications are used to treat ACS.  Remember to always take medications as prescribed and never stop talking medications unless told by your doctor.    You have been prescribed the following medicatons:    Aspirin - Aspirin is used as a blood thinning medication and you will require this medication indefinitely.  Anti-platelet/blood thinner - Your Anti-platelet/Blood thinning medication is called Plavix, and is used in combination with aspirin to prevent clots from forming in your heart and/or around your stent.  Your doctor will determine how long you need to be on this medicine.  Beta-Blocker - Beta-Blocker Metoprolol SR is used to lower blood pressure  and heart rate, and/or helps your heart heal after a heart attack.  Statin - Statin Atorvastatin is used to lower cholesterol.

## 2024-11-20 NOTE — THERAPY
Physical Therapy   Initial Evaluation     Patient Name: Rancho He  Age:  74 y.o., Sex:  male  Medical Record #: [id number]  Today's Date: 11/20/2024     Precautions  Precautions: Fall Risk;Cardiac Precautions (See Comments)    Assessment  Patient is a 74 y.o. male with hx of T2DM, hypertension, and hyperlipidemia. Pt admitted with STEMI with acute inferior myocardial infarction and failed TNK s/p PCI with stent placement to culprit proximal and mid RCA on 11/16. Pt remaining with post op multivessel disease, cardiothoracic surgery recommending outpatient PCI. PT eval complete, pt currently presents at/near his functional baseline and completed all mobility at SPV level with use of FWW. Pt educated on cardiac precautions, phase I/II rehab, activity progression, and RPE scale/talk test. Anticipate pt will be paulo to safely DC home with HHPT followed by cardiac rehab. Patient will not be actively followed for physical therapy services at this time, however may be seen if requested by physician for 1 more visit within 30 days to address any discharge or equipment needs.     Plan    Physical Therapy Initial Treatment Plan   Duration: Discharge Needs Only    DC Equipment Recommendations: None  Discharge Recommendations: Recommend home health for continued physical therapy services (followed by cardiac rehab)         Vitals   O2 (LPM) 0   O2 Delivery Device None - Room Air   Vitals Comments VSS   Pain 0 - 10 Group   Therapist Pain Assessment   (no c/o pain)   Prior Living Situation   Prior Services None;Home-Independent   Housing / Facility 1 Story Apartment / Condo   Steps Into Home 0   Steps In Home 0   Equipment Owned Single Point Cane   Lives with - Patient's Self Care Capacity Related Adult   Comments reports living with his grand daughter who can assist with IADL's if needed   Prior Level of Functional Mobility   Bed Mobility Independent   Transfer Status Independent   Ambulation Independent   Ambulation Distance  community distances   Assistive Devices Used Single Point Cane   Cognition    Cognition / Consciousness WDL   Level of Consciousness Alert   Comments pleasant and participatory   Active ROM Lower Body    Active ROM Lower Body  WDL   Strength Lower Body   Lower Body Strength  WDL   Balance Assessment   Sitting Balance (Static) Good   Sitting Balance (Dynamic) Fair +   Standing Balance (Static) Fair +   Standing Balance (Dynamic) Fair   Weight Shift Sitting Fair   Weight Shift Standing Fair   Comments FWW   Bed Mobility    Supine to Sit Supervised   Sit to Supine Supervised   Scooting Supervised   Gait Analysis   Gait Level Of Assist Supervised   Assistive Device Front Wheel Walker   Distance (Feet) 200   # of Times Distance was Traveled 1   Deviation Bradykinetic   Weight Bearing Status no restrictions   Functional Mobility   Sit to Stand Supervised   Bed, Chair, Wheelchair Transfer Supervised   Mobility FWW   6 Clicks Assessment - How much HELP from from another person do you currently need... (If the patient hasn't done an activity recently, how much help from another person do you think he/she would need if he/she tried?)   Turning from your back to your side while in a flat bed without using bedrails? 4   Moving from lying on your back to sitting on the side of a flat bed without using bedrails? 4   Moving to and from a bed to a chair (including a wheelchair)? 4   Standing up from a chair using your arms (e.g., wheelchair, or bedside chair)? 4   Walking in hospital room? 4   Climbing 3-5 steps with a railing? 3   6 clicks Mobility Score 23   Activity Tolerance   Comments no overt pain, SOB, or fatigue   Education Group   Education Provided Cardiac Precautions;Role of Physical Therapist   Cardiac Precautions Patient Response Patient;Acceptance;Explanation;Handout;Verbal Demonstration   Role of Physical Therapist Patient Response Patient;Acceptance;Explanation;Verbal Demonstration   Physical Therapy Initial  Treatment Plan    Duration Discharge Needs Only   Problem List    Problems None   Anticipated Discharge Equipment and Recommendations   DC Equipment Recommendations None   Discharge Recommendations Recommend home health for continued physical therapy services  (followed by cardiac rehab)   Interdisciplinary Plan of Care Collaboration   IDT Collaboration with  Nursing   Patient Position at End of Therapy In Bed;Bed Alarm On;Call Light within Reach;Tray Table within Reach;Phone within Reach   Collaboration Comments RN udpated   Session Information   Date / Session Number  11/20- 1x only (DC needs)

## 2024-11-20 NOTE — DISCHARGE PLANNING
Case Management Discharge Planning  Admission Date: 11/16/2024  GMLOS: 2  ALOS: 4    6-Clicks ADL Score: 24  6-Clicks Mobility Score: 23    Anticipated Discharge Dispo: Discharge Disposition: Discharged to home/self care (01)  Discharge Address: [address] [facility] [address]    DME Needed: No    Action(s) Taken: Choice obtained and Referral(s) sent    RNCM met with patient to obtain home health choice. MD ordered home health and patient is agreeable with 1- [facility]; [facility]- [facility].     Choice form faxed to [facility]; referral sent to pt's 1st choice. Pending review by [facility].     1015 AM- Patient has been accepted by [facility].     Escalations Completed: None    Medically Clear: Yes    Next Steps: Patient has been accepted by [facility]. Family will provide transportation home.     Barriers to Discharge: Outpatient referrals pending and Transportation    Is the patient up for discharge tomorrow: No

## 2024-11-27 NOTE — TELEPHONE ENCOUNTER
Patient is scheduled on 12-24-24 for a C w/PCI with . No meds to stop and patient to check in at 10:30 for a 12:30 procedure. Updated H&P to be done on admit by NP. Pre admit to call patient.

## 2024-12-18 ENCOUNTER — TELEMEDICINE2 (OUTPATIENT)
Dept: NEPHROLOGY | Facility: MEDICAL CENTER | Age: 74
End: 2024-12-18
Payer: MEDICARE

## 2024-12-18 VITALS
RESPIRATION RATE: 18 BRPM | TEMPERATURE: 98.4 F | OXYGEN SATURATION: 99 % | HEART RATE: 97 BPM | WEIGHT: 170 LBS | DIASTOLIC BLOOD PRESSURE: 71 MMHG | BODY MASS INDEX: 23.03 KG/M2 | HEIGHT: 72 IN | SYSTOLIC BLOOD PRESSURE: 128 MMHG

## 2024-12-18 DIAGNOSIS — E11.9 TYPE 2 DIABETES MELLITUS WITHOUT COMPLICATION, WITHOUT LONG-TERM CURRENT USE OF INSULIN (HCC): ICD-10-CM

## 2024-12-18 DIAGNOSIS — I10 PRIMARY HYPERTENSION: ICD-10-CM

## 2024-12-18 DIAGNOSIS — N17.9 AKI (ACUTE KIDNEY INJURY) (HCC): ICD-10-CM

## 2024-12-18 PROCEDURE — G2211 COMPLEX E/M VISIT ADD ON: HCPCS | Performed by: INTERNAL MEDICINE

## 2024-12-18 PROCEDURE — 99204 OFFICE O/P NEW MOD 45 MIN: CPT | Performed by: INTERNAL MEDICINE

## 2024-12-18 PROCEDURE — 3074F SYST BP LT 130 MM HG: CPT | Performed by: INTERNAL MEDICINE

## 2024-12-18 PROCEDURE — 3078F DIAST BP <80 MM HG: CPT | Performed by: INTERNAL MEDICINE

## 2024-12-18 RX ORDER — LOSARTAN POTASSIUM AND HYDROCHLOROTHIAZIDE 12.5; 1 MG/1; MG/1
1 TABLET ORAL DAILY
COMMUNITY
Start: 2024-10-09

## 2024-12-18 ASSESSMENT — ENCOUNTER SYMPTOMS
VOMITING: 0
COUGH: 0
CHILLS: 0
NAUSEA: 0
SHORTNESS OF BREATH: 0
FEVER: 0

## 2024-12-18 ASSESSMENT — FIBROSIS 4 INDEX: FIB4 SCORE: 1.59

## 2024-12-18 NOTE — PROGRESS NOTES
Telemedicine: New Patient   This evaluation was conducted via Teams using secure and encrypted videoconferencing technology. The patient was physically located at [facility] in [place], [place]. The patient was presented by a medical professional at the originating site.   The patient's identity was confirmed and verbal consent was obtained for this telemedicine encounter.    Subjective:     CC:   Chief Complaint   Patient presents with    Chronic Kidney Disease    Hypertension       Rancho He is a 74 y.o. male presenting to establish care and to discuss the evaluation and management of: LAUREL    The patient has longstanding diabetes, recently hospitalized for acute coronary syndrome, status post coronary angiogram and angioplasty, developed acute kidney injury, kidney function has been improving since discharge from the hospital  Patient has no hematuria or dysuria  No recent use of NSAIDs  Patient also has a history of longstanding hypertension.  This blood pressure has been under good control  Patient has no chest pain or shortness of breath    Review of Systems   Constitutional:  Negative for chills, fever and malaise/fatigue.   Respiratory:  Negative for cough and shortness of breath.    Cardiovascular:  Negative for chest pain and leg swelling.   Gastrointestinal:  Negative for nausea and vomiting.   Genitourinary:  Negative for dysuria, frequency and urgency.         No Known Allergies    Current medicines (including changes today)  Current Outpatient Medications   Medication Sig Dispense Refill    metFORMIN (GLUCOPHAGE) 500 MG Tab Take 500 mg by mouth 2 times a day.      losartan-hydrochlorothiazide (HYZAAR) 100-12.5 MG per tablet Take 1 Tablet by mouth every day.      aspirin 81 MG EC tablet Take 1 Tablet by mouth every day. 200 Tablet 3    atorvastatin (LIPITOR) 80 MG tablet Take 1 Tablet by mouth every evening. 90 Tablet 4    clopidogrel (PLAVIX) 75 MG Tab Take 1 Tablet by mouth every  day. 90 Tablet 4    metoprolol SR (TOPROL XL) 25 MG TABLET SR 24 HR Take 1 Tablet by mouth every day. 90 Tablet 2    omeprazole (PRILOSEC) 20 MG delayed-release capsule Take 1 Capsule by mouth every day. 90 Capsule 3     No current facility-administered medications for this visit.       He  has no past medical history on file.  He  has no past surgical history on file.      History reviewed. No pertinent family history.  No family status information on file.       Patient Active Problem List    Diagnosis Date Noted    Normocytic normochromic anemia 11/17/2024    Bandemia 11/17/2024    LAUREL (acute kidney injury) ([facility]) 11/17/2024    Coronary artery disease 11/17/2024    ST elevation myocardial infarction involving right coronary artery ([facility]) 11/16/2024    Primary hypertension 11/16/2024    Type 2 diabetes mellitus ([facility]) 11/16/2024    Tremor 11/16/2024          Objective:   /71 (BP Location: Left arm, Patient Position: Sitting, BP Cuff Size: Adult)   Pulse 97   Temp 36.9 °C (98.4 °F) (Temporal)   Resp 18   Ht 1.829 m (6')   Wt 77.1 kg (170 lb)   SpO2 99%   BMI 23.06 kg/m²     Physical Exam  Constitutional:       General: He is not in acute distress.     Appearance: Normal appearance. He is not ill-appearing.   HENT:      Head: Normocephalic and atraumatic.      Right Ear: External ear normal.      Left Ear: External ear normal.      Nose: Nose normal.   Eyes:      General: No scleral icterus.        Right eye: No discharge.         Left eye: No discharge.      Conjunctiva/sclera: Conjunctivae normal.   Cardiovascular:      Rate and Rhythm: Normal rate.   Pulmonary:      Effort: Pulmonary effort is normal. No respiratory distress.      Breath sounds: Normal breath sounds. No rhonchi.   Musculoskeletal:         General: No tenderness.      Right lower leg: No edema.      Left lower leg: No edema.   Skin:     General: Skin is warm.      Coloration: Skin is not jaundiced.   Neurological:      General: No focal  deficit present.      Mental Status: He is alert and oriented to person, place, and time. Mental status is at baseline.   Psychiatric:         Mood and Affect: Mood normal.         Behavior: Behavior normal.         Thought Content: Thought content normal.     History reviewed. No pertinent past medical history.  Social History     Socioeconomic History    Marital status: Not on file     Spouse name: Not on file    Number of children: Not on file    Years of education: Not on file    Highest education level: Not on file   Occupational History    Not on file   Tobacco Use    Smoking status: Former     Types: Cigarettes    Smokeless tobacco: Current     Types: Chew   Vaping Use    Vaping status: Never Used   Substance and Sexual Activity    Alcohol use: Not on file    Drug use: Not on file    Sexual activity: Not on file   Other Topics Concern    Not on file   Social History Narrative    Not on file     Social Drivers of Health     Financial Resource Strain: Not on file   Food Insecurity: Food Insecurity Present (11/16/2024)    Hunger Vital Sign     Worried About Running Out of Food in the Last Year: Sometimes true     Ran Out of Food in the Last Year: Never true   Transportation Needs: No Transportation Needs (11/16/2024)    PRAPARE - Transportation     Lack of Transportation (Medical): No     Lack of Transportation (Non-Medical): No   Physical Activity: Not on file   Stress: Not on file   Social Connections: Not on file   Intimate Partner Violence: Not At Risk (11/16/2024)    Humiliation, Afraid, Rape, and Kick questionnaire     Fear of Current or Ex-Partner: No     Emotionally Abused: No     Physically Abused: No     Sexually Abused: No   Housing Stability: Low Risk  (11/16/2024)    Housing Stability Vital Sign     Unable to Pay for Housing in the Last Year: No     Number of Times Moved in the Last Year: 0     Homeless in the Last Year: No     History reviewed. No pertinent family history.  Recent Labs      11/17/24  0517 11/18/24  0509 11/19/24  0401 11/20/24  0214   ALBUMIN  --   --   --  2.8*   HDL 40  --   --   --    TRIGLYCERIDE 75  --   --   --    SODIUM 137 139 139 136   POTASSIUM 4.4 4.1 4.0 3.8   CHLORIDE 106 106 105 104   CO2 19* 21 23 22   BUN 36* 41* 39* 29*   CREATININE 2.17* 2.24* 1.69* 1.39   PHOSPHORUS 3.9 3.5 3.0  --            Assessment and Plan:   The following treatment plan was discussed:     1. LAUREL (acute kidney injury) (HCC)  Etiology is most likely cardiorenal syndrome  Kidney function is improving  No uremic symptoms  Renal dose of medication  Avoid nephrotoxins  Continue same medication regimen  Patient was advised to call us if symptoms worsen  Recheck labs in 2 to 3 months after hydration    2. Primary hypertension  Controlled  Continue same medication regimen  Continue low-sodium diet      3. Type 2 diabetes mellitus without complication, without long-term current use of insulin ([facility])  Optimize diabetes control for hemoglobin A1c below 7%      Follow-up: Return in about 3 months (around 3/18/2025).

## 2024-12-31 ENCOUNTER — PRE-ADMISSION TESTING (OUTPATIENT)
Dept: ADMISSIONS | Facility: MEDICAL CENTER | Age: 74
End: 2024-12-31
Attending: INTERNAL MEDICINE
Payer: MEDICARE

## 2024-12-31 ENCOUNTER — APPOINTMENT (OUTPATIENT)
Dept: CARDIOLOGY | Facility: MEDICAL CENTER | Age: 74
End: 2024-12-31
Attending: PHYSICIAN ASSISTANT
Payer: MEDICARE

## 2024-12-31 VITALS — BODY MASS INDEX: 23.06 KG/M2 | HEIGHT: 72 IN

## 2024-12-31 RX ORDER — PIOGLITAZONE 30 MG/1
30 TABLET ORAL DAILY
COMMUNITY
Start: 2024-10-09

## 2024-12-31 NOTE — OR NURSING
Preadmit: Telephone preadmit done with patient scheduled for cath lab procedure on 1/2/25 with Dr. Clay. Pre-procedure instructions, fasting guidelines, check in location, and medication instructions reviewed with patient. Patient to hold am dose of metformin and actos day of procedure due to fasting for procedure. Patient may continue other medications as prescribed. Patient verbalized understanding of medication and fasting instructions. Unable to send patient copy of instructions as the patient does not have access to [employer] or email.

## 2024-12-31 NOTE — PROGRESS NOTES
No chief complaint on file.         Subjective:   Rancho He is a 74 y.o. male who presents today for hospital follow-up.     His medical history significant for hypertension, diabetes mellitus, hyperlipidemia, former smoker who was transferred from [facility] on 11/16/2024 with inferior STEMI.  He received TNK and failed. Taken for emergent cath. 6 coronary angiography showed 99% thrombotic culprit lesion of the proximal and mid RCA status post successful emergency PCI to proximal and mid RCA with 1 drug-eluting stent.  He has residual LAD and left circumflex disease.  Cardiothoracic surgery was consulted and ultimately staged PCI was recommended as the patient does not want open heart surgery.      Past Medical History:   Diagnosis Date    Diabetes (HCC)     High cholesterol     Hypertension     Myocardial infarct (HCC) 11/2024    Renal disorder     hx of acute kidney injury per problem list         No family history on file.      Social History     Tobacco Use    Smoking status: Former     Types: Cigarettes    Smokeless tobacco: Current     Types: Chew   Vaping Use    Vaping status: Never Used   Substance Use Topics    Alcohol use: Never    Drug use: Never         No Known Allergies      Current Outpatient Medications   Medication Sig    pioglitazone (ACTOS) 30 MG Tab Take 30 mg by mouth every day.    Multiple Vitamins-Minerals (CENTRUM SILVER 50+MEN) Tab Take  by mouth every day.    metFORMIN (GLUCOPHAGE) 500 MG Tab Take 500 mg by mouth 2 times a day.    losartan-hydrochlorothiazide (HYZAAR) 100-12.5 MG per tablet Take 1 Tablet by mouth every day.    aspirin 81 MG EC tablet Take 1 Tablet by mouth every day.    atorvastatin (LIPITOR) 80 MG tablet Take 1 Tablet by mouth every evening.    clopidogrel (PLAVIX) 75 MG Tab Take 1 Tablet by mouth every day.    metoprolol SR (TOPROL XL) 25 MG TABLET SR 24 HR Take 1 Tablet by mouth every day.    omeprazole (PRILOSEC) 20 MG delayed-release capsule  Take 1 Capsule by mouth every day.         ROS       Objective:   There were no vitals taken for this visit. There is no height or weight on file to calculate BMI.         Physical Exam       Assessment:   No diagnosis found.     Medical Decision Making:  Today's Assessment / Plan:       No follow-ups on file.  Sooner if problems.

## 2025-01-02 ENCOUNTER — HOSPITAL ENCOUNTER (OUTPATIENT)
Facility: MEDICAL CENTER | Age: 75
End: 2025-01-02
Attending: INTERNAL MEDICINE | Admitting: INTERNAL MEDICINE
Payer: MEDICARE

## 2025-01-02 ENCOUNTER — APPOINTMENT (OUTPATIENT)
Dept: CARDIOLOGY | Facility: MEDICAL CENTER | Age: 75
End: 2025-01-02
Attending: INTERNAL MEDICINE
Payer: MEDICARE

## 2025-01-02 VITALS
BODY MASS INDEX: 22.66 KG/M2 | RESPIRATION RATE: 16 BRPM | WEIGHT: 167.33 LBS | HEIGHT: 72 IN | DIASTOLIC BLOOD PRESSURE: 66 MMHG | HEART RATE: 82 BPM | TEMPERATURE: 97.4 F | OXYGEN SATURATION: 92 % | SYSTOLIC BLOOD PRESSURE: 118 MMHG

## 2025-01-02 DIAGNOSIS — I25.10 CORONARY ARTERY DISEASE DUE TO LIPID RICH PLAQUE: ICD-10-CM

## 2025-01-02 DIAGNOSIS — I25.83 CORONARY ARTERY DISEASE DUE TO LIPID RICH PLAQUE: ICD-10-CM

## 2025-01-02 LAB
ACT BLD: 291 SEC (ref 74–137)
ALBUMIN SERPL BCP-MCNC: 3.9 G/DL (ref 3.2–4.9)
ALBUMIN/GLOB SERPL: 1.1 G/DL
ALP SERPL-CCNC: 70 U/L (ref 30–99)
ALT SERPL-CCNC: 13 U/L (ref 2–50)
ANION GAP SERPL CALC-SCNC: 12 MMOL/L (ref 7–16)
APTT PPP: 25.4 SEC (ref 24.7–36)
AST SERPL-CCNC: 23 U/L (ref 12–45)
BILIRUB SERPL-MCNC: 0.5 MG/DL (ref 0.1–1.5)
BUN SERPL-MCNC: 10 MG/DL (ref 8–22)
CALCIUM ALBUM COR SERPL-MCNC: 9.6 MG/DL (ref 8.5–10.5)
CALCIUM SERPL-MCNC: 9.5 MG/DL (ref 8.5–10.5)
CHLORIDE SERPL-SCNC: 105 MMOL/L (ref 96–112)
CO2 SERPL-SCNC: 24 MMOL/L (ref 20–33)
CREAT SERPL-MCNC: 1.02 MG/DL (ref 0.5–1.4)
EKG IMPRESSION: NORMAL
EKG IMPRESSION: NORMAL
ERYTHROCYTE [DISTWIDTH] IN BLOOD BY AUTOMATED COUNT: 49.3 FL (ref 35.9–50)
GFR SERPLBLD CREATININE-BSD FMLA CKD-EPI: 77 ML/MIN/1.73 M 2
GLOBULIN SER CALC-MCNC: 3.5 G/DL (ref 1.9–3.5)
GLUCOSE SERPL-MCNC: 136 MG/DL (ref 65–99)
HCT VFR BLD AUTO: 42.7 % (ref 42–52)
HGB BLD-MCNC: 13.4 G/DL (ref 14–18)
INR PPP: 0.98 (ref 0.87–1.13)
MCH RBC QN AUTO: 29.6 PG (ref 27–33)
MCHC RBC AUTO-ENTMCNC: 31.4 G/DL (ref 32.3–36.5)
MCV RBC AUTO: 94.3 FL (ref 81.4–97.8)
PLATELET # BLD AUTO: 326 K/UL (ref 164–446)
PMV BLD AUTO: 9.2 FL (ref 9–12.9)
POTASSIUM SERPL-SCNC: 3.9 MMOL/L (ref 3.6–5.5)
PROT SERPL-MCNC: 7.4 G/DL (ref 6–8.2)
PROTHROMBIN TIME: 13 SEC (ref 12–14.6)
RBC # BLD AUTO: 4.53 M/UL (ref 4.7–6.1)
SODIUM SERPL-SCNC: 141 MMOL/L (ref 135–145)
WBC # BLD AUTO: 7.8 K/UL (ref 4.8–10.8)

## 2025-01-02 PROCEDURE — 700117 HCHG RX CONTRAST REV CODE 255: Mod: UD | Performed by: INTERNAL MEDICINE

## 2025-01-02 PROCEDURE — 700101 HCHG RX REV CODE 250

## 2025-01-02 PROCEDURE — 92978 ENDOLUMINL IVUS OCT C 1ST: CPT | Mod: 26 | Performed by: INTERNAL MEDICINE

## 2025-01-02 PROCEDURE — 80053 COMPREHEN METABOLIC PANEL: CPT

## 2025-01-02 PROCEDURE — 160046 HCHG PACU - 1ST 60 MINS PHASE II

## 2025-01-02 PROCEDURE — 85027 COMPLETE CBC AUTOMATED: CPT

## 2025-01-02 PROCEDURE — 93010 ELECTROCARDIOGRAM REPORT: CPT | Performed by: STUDENT IN AN ORGANIZED HEALTH CARE EDUCATION/TRAINING PROGRAM

## 2025-01-02 PROCEDURE — 93458 L HRT ARTERY/VENTRICLE ANGIO: CPT | Mod: 26,59 | Performed by: INTERNAL MEDICINE

## 2025-01-02 PROCEDURE — 85610 PROTHROMBIN TIME: CPT

## 2025-01-02 PROCEDURE — 92928 PRQ TCAT PLMT NTRAC ST 1 LES: CPT | Performed by: INTERNAL MEDICINE

## 2025-01-02 PROCEDURE — 85730 THROMBOPLASTIN TIME PARTIAL: CPT

## 2025-01-02 PROCEDURE — 700111 HCHG RX REV CODE 636 W/ 250 OVERRIDE (IP): Mod: JZ,UD

## 2025-01-02 PROCEDURE — 99153 MOD SED SAME PHYS/QHP EA: CPT

## 2025-01-02 PROCEDURE — 93005 ELECTROCARDIOGRAM TRACING: CPT | Mod: TC | Performed by: INTERNAL MEDICINE

## 2025-01-02 PROCEDURE — 160002 HCHG RECOVERY MINUTES (STAT)

## 2025-01-02 PROCEDURE — 160035 HCHG PACU - 1ST 60 MINS PHASE I

## 2025-01-02 PROCEDURE — 99152 MOD SED SAME PHYS/QHP 5/>YRS: CPT | Performed by: INTERNAL MEDICINE

## 2025-01-02 PROCEDURE — 85347 COAGULATION TIME ACTIVATED: CPT

## 2025-01-02 PROCEDURE — 160036 HCHG PACU - EA ADDL 30 MINS PHASE I

## 2025-01-02 RX ORDER — CLOPIDOGREL BISULFATE 75 MG/1
75 TABLET ORAL DAILY
Status: DISCONTINUED | OUTPATIENT
Start: 2025-01-03 | End: 2025-01-02 | Stop reason: HOSPADM

## 2025-01-02 RX ORDER — SODIUM CHLORIDE 9 MG/ML
INJECTION, SOLUTION INTRAVENOUS
Status: DISCONTINUED | OUTPATIENT
Start: 2025-01-02 | End: 2025-01-02 | Stop reason: HOSPADM

## 2025-01-02 RX ORDER — LIDOCAINE HYDROCHLORIDE 20 MG/ML
INJECTION, SOLUTION INFILTRATION; PERINEURAL
Status: COMPLETED
Start: 2025-01-02 | End: 2025-01-02

## 2025-01-02 RX ORDER — ASPIRIN 81 MG/1
81 TABLET ORAL DAILY
Status: DISCONTINUED | OUTPATIENT
Start: 2025-01-03 | End: 2025-01-02 | Stop reason: HOSPADM

## 2025-01-02 RX ORDER — MIDAZOLAM HYDROCHLORIDE 1 MG/ML
INJECTION INTRAMUSCULAR; INTRAVENOUS
Status: COMPLETED
Start: 2025-01-02 | End: 2025-01-02

## 2025-01-02 RX ORDER — HEPARIN SODIUM 1000 [USP'U]/ML
INJECTION, SOLUTION INTRAVENOUS; SUBCUTANEOUS
Status: COMPLETED
Start: 2025-01-02 | End: 2025-01-02

## 2025-01-02 RX ORDER — VERAPAMIL HYDROCHLORIDE 2.5 MG/ML
INJECTION, SOLUTION INTRAVENOUS
Status: COMPLETED
Start: 2025-01-02 | End: 2025-01-02

## 2025-01-02 RX ORDER — HEPARIN SODIUM 200 [USP'U]/100ML
INJECTION, SOLUTION INTRAVENOUS
Status: COMPLETED
Start: 2025-01-02 | End: 2025-01-02

## 2025-01-02 RX ADMIN — IOHEXOL 61 ML: 350 INJECTION, SOLUTION INTRAVENOUS at 13:36

## 2025-01-02 RX ADMIN — HEPARIN SODIUM 6000 UNITS: 1000 INJECTION, SOLUTION INTRAVENOUS; SUBCUTANEOUS at 13:15

## 2025-01-02 RX ADMIN — HEPARIN SODIUM 2000 UNITS: 200 INJECTION, SOLUTION INTRAVENOUS at 13:10

## 2025-01-02 RX ADMIN — HEPARIN SODIUM 1000 UNITS: 1000 INJECTION, SOLUTION INTRAVENOUS; SUBCUTANEOUS at 13:17

## 2025-01-02 RX ADMIN — VERAPAMIL HYDROCHLORIDE 2.5 MG: 2.5 INJECTION, SOLUTION INTRAVENOUS at 13:10

## 2025-01-02 RX ADMIN — MIDAZOLAM HYDROCHLORIDE 2 MG: 1 INJECTION, SOLUTION INTRAMUSCULAR; INTRAVENOUS at 13:18

## 2025-01-02 RX ADMIN — LIDOCAINE HYDROCHLORIDE: 20 INJECTION, SOLUTION INFILTRATION; PERINEURAL at 13:10

## 2025-01-02 RX ADMIN — FENTANYL CITRATE 75 MCG: 50 INJECTION, SOLUTION INTRAMUSCULAR; INTRAVENOUS at 13:35

## 2025-01-02 RX ADMIN — NITROGLYCERIN 10 ML: 20 INJECTION INTRAVENOUS at 13:10

## 2025-01-02 ASSESSMENT — FIBROSIS 4 INDEX: FIB4 SCORE: 1.59

## 2025-01-02 ASSESSMENT — PAIN DESCRIPTION - PAIN TYPE
TYPE: SURGICAL PAIN
TYPE: SURGICAL PAIN

## 2025-01-02 NOTE — H&P
HPI:  74 y.o.-year-old patient here for elective coronary angiogram and PCI of residual disease following recent MI    Medications / Drug list prior to admission:  No current facility-administered medications on file prior to encounter.     Current Outpatient Medications on File Prior to Encounter   Medication Sig Dispense Refill    aspirin 81 MG EC tablet Take 1 Tablet by mouth every day. 200 Tablet 3    atorvastatin (LIPITOR) 80 MG tablet Take 1 Tablet by mouth every evening. 90 Tablet 4    clopidogrel (PLAVIX) 75 MG Tab Take 1 Tablet by mouth every day. 90 Tablet 4    metoprolol SR (TOPROL XL) 25 MG TABLET SR 24 HR Take 1 Tablet by mouth every day. 90 Tablet 2    omeprazole (PRILOSEC) 20 MG delayed-release capsule Take 1 Capsule by mouth every day. 90 Capsule 3       Current list of administered Medications:    Current Facility-Administered Medications:     NS infusion, , Intravenous, PPU Continuous, Matt Clay M.D.    VERAPAMIL HCL 2.5 MG/ML IV SOLN, , , ,     HEPARIN SODIUM (PORCINE) 1000 UNIT/ML INJ SOLN, , , ,     LIDOCAINE HCL 2 % INJ SOLN, , , ,     HEPARIN (PORCINE) IN NACL 2000-0.9 UNIT/L-% IV SOLN, , , ,     NITROGLYCERIN 2 MG IV SOLN, , , ,     Past Medical History:   Diagnosis Date    Diabetes (HCC)     High cholesterol     Hypertension     Myocardial infarct (HCC) 11/2024    Renal disorder     hx of acute kidney injury per problem list       Past Surgical History:   Procedure Laterality Date    CARDIAC CATH, RIGHT/LEFT HEART      11/2024       No family history on file.  Patient family history was personally reviewed, no pertinent family history to current presentation    Social History     Tobacco Use    Smoking status: Former     Types: Cigarettes    Smokeless tobacco: Current     Types: Chew   Vaping Use    Vaping status: Never Used   Substance Use Topics    Alcohol use: Never    Drug use: Never       ALLERGIES:  No Known Allergies    Review of systems:  A complete review of symptoms  was reviewed with patient. This is reviewed in H&P and PMH. ALL OTHERS reviewed and negative    Physical exam:  Patient Vitals for the past 24 hrs:   BP Temp Temp src Pulse Resp SpO2 Height Weight   01/02/25 0833 (!) 147/92 36.6 °C (97.8 °F) Temporal 86 16 96 % 1.829 m (6') 75.9 kg (167 lb 5.3 oz)     General: No acute distress.   EYES: no jaundice  HEENT: OP clear   Neck:  No JVD.   CVS:  Regular  Resp: Normal respiratory effort, CTAB. No wheezing or crackles/rhonchi.        Data:  Laboratory studies personally reviewed by me:  Recent Results (from the past 24 hours)   PT    Collection Time: 01/02/25  8:45 AM   Result Value Ref Range    PT 13.0 12.0 - 14.6 sec    INR 0.98 0.87 - 1.13   COMP METABOLIC PANEL    Collection Time: 01/02/25  8:45 AM   Result Value Ref Range    Sodium 141 135 - 145 mmol/L    Potassium 3.9 3.6 - 5.5 mmol/L    Chloride 105 96 - 112 mmol/L    Co2 24 20 - 33 mmol/L    Anion Gap 12.0 7.0 - 16.0    Glucose 136 (H) 65 - 99 mg/dL    Bun 10 8 - 22 mg/dL    Creatinine 1.02 0.50 - 1.40 mg/dL    Calcium 9.5 8.5 - 10.5 mg/dL    Correct Calcium 9.6 8.5 - 10.5 mg/dL    AST(SGOT) 23 12 - 45 U/L    ALT(SGPT) 13 2 - 50 U/L    Alkaline Phosphatase 70 30 - 99 U/L    Total Bilirubin 0.5 0.1 - 1.5 mg/dL    Albumin 3.9 3.2 - 4.9 g/dL    Total Protein 7.4 6.0 - 8.2 g/dL    Globulin 3.5 1.9 - 3.5 g/dL    A-G Ratio 1.1 g/dL   APTT    Collection Time: 01/02/25  8:45 AM   Result Value Ref Range    APTT 25.4 24.7 - 36.0 sec   CBC WITHOUT DIFFERENTIAL    Collection Time: 01/02/25  8:45 AM   Result Value Ref Range    WBC 7.8 4.8 - 10.8 K/uL    RBC 4.53 (L) 4.70 - 6.10 M/uL    Hemoglobin 13.4 (L) 14.0 - 18.0 g/dL    Hematocrit 42.7 42.0 - 52.0 %    MCV 94.3 81.4 - 97.8 fL    MCH 29.6 27.0 - 33.0 pg    MCHC 31.4 (L) 32.3 - 36.5 g/dL    RDW 49.3 35.9 - 50.0 fL    Platelet Count 326 164 - 446 K/uL    MPV 9.2 9.0 - 12.9 fL   ESTIMATED GFR    Collection Time: 01/02/25  8:45 AM   Result Value Ref Range    GFR (CKD-EPI) 77 >60  mL/min/1.73 m 2   EKG    Collection Time: 25 10:32 AM   Result Value Ref Range    Report       [facility]    Test Date:  2025  Pt Name:    BETINA ANN              Department: [facility]  MRN:        [id number]                      Room:  Gender:     Male                         Technician: TOYAYEIMI  :        1950                   Requested By:MIGUEL CLAY  Order #:    [id number]                    Reading MD: Edel Colón MD    Measurements  Intervals                                Axis  Rate:       83                           P:          43  SC:         148                          QRS:        -38  QRSD:       81                           T:          -43  QT:         372  QTc:        437    Interpretive Statements  Sinus rhythm  Abnormal R-wave progression, late transition  Inferior infarct, age indeterminate  Compared to ECG 2024 00:11:48  No significant changes  Electronically Signed On 2025 10:32:40 PST by Edel Colón MD         Imaging:  No orders to display         Pertinent cardiac testing, EKG, echocardiogram, prior angiogram films if any reviewed    All pertinent features of laboratory and imaging reviewed including primary images where applicable      Active Problems:    * No active hospital problems. *  Resolved Problems:    * No resolved hospital problems. *      Assessment / Plan:  74 y.o.-year-old patient here for elective coronary angiogram with possible coronary intervention.  Risk benefits of the procedure discussed in detail, patient agrees to proceed        I personally discussed his case with  Dr Miguel Clay M.D.    Future Appointments   Date Time Provider Department Center   3/12/2025 11:00 AM Fadi Najjar, M.D. [facility]       It is my pleasure to participate in the care of Mr. Ann.  Please do not hesitate to contact me with questions or concerns.    Miguel Clay M.D.    2025

## 2025-01-02 NOTE — PROCEDURES
Cardiac Catheterization and Percutaneous Intervention Procedure Report    1/2/2025    Primary Care Provider: FEDERICO Kelly    Indication for procedure: Multivessel CAD, Recent STEMI    Procedures:  Coronary arteriograms  Left heart catheterization  IVUS guided Angioplasty and placement of a 4.5 by 16mm Synergy drug-eluting stent in proximal  left circumflex coronary artery.      Recommendations: Guideline directed medical therapy and risk factor management      Coronary arteriograms:  Left main: normal  Left anterior descending: Mid LAD stenosis appears to be 40 to 50%, improved compared to prior angiogram likely due to less vasoconstrictive state.  Left circumflex: 80% proximal left circumflex artery disease  Right coronary: recently placed stents widely patent with improved flow., dominant    Left Heart Catheterization:  Left Ventricular EDP: 18 mm Hg   Aortic Valve Gradient: No significant AV gradient noted    Procedure details:  Rancho He was brought to the cardiac catheterization lab where the right wrist was prepped and draped in the usual manner for cardiac catheterization.  Timeout was performed.  The area was anesthetized with lidocaine and a 5/6 FR sheath was inserted into the right radial artery without difficulty. A #3.5 EBU guide catheter was advanced to the ostia of the Left coronary artery and arteriograms were recorded.   A #4 right Jonathan catheter was advanced to the ostia of the right coronary artery and arteriograms were recorded. Aortic valve was crossed using #4 right Jonathan catheter left heart catheterization was performed.  Patient underwent percutaneous coronary revascularization as outlined below.  At the completion of the case the sheath was removed and hemostasis achieved utilizing a radial compression band .  Patient was pain-free and hemodynamically stable at the completion of the case.  There were no apparent complications.    Interventional Procedure LCX:     Given  "the patient's clinical presentation and coronary anatomy, PCI was indicated and we proceeded with the intervention as detailed below.    Indication for PCI:  Severe CAD, recent MI, complete revascularization    Pre: 80 %, 12 mm length, UJNIOR 3 flow  Post: 0%, JUNIOR 3 flow    Lesion complexity  Non-High  Severe calcification No  Bifurcation  yes    Guide catheter: EBU 3.5 was advanced to the ostia of the left coronary artery.    Guide wire: A 0.014\" mm  Runthrough was advanced into the artery and crossed the lesion.  IVUS performed which showed, vessel diameter 4.5 mm with severe concentric plaque.    Stent: A 4.5 by 16 mm Synergy drug-eluting  stent was deployed in proximal  left circumflex coronary artery at [address] [place].      Anticoagulant: Heparin  Antiplatelet: clopidogrel  EBL <25 cc  Complications: none  Specimens: none  Contrast: 60 cc  Fluorotime : see cath lab flowsheet      Sedation: I supervised moderate sedation over a trained independent observer.    Sedation start time: 13:03  End time: 13:36      Electronically signed by   Matt Clay M.D., JENS  Interventional cardiologist  1/2/2025  1:46 PM          "

## 2025-01-02 NOTE — OR NURSING
1347 - Patient arrived to PACU with OR RN. A&Ox4. Even, unlabored respirations. VSS. Declines pain. Declines nausea. Right radial cath site with TR band in place. Site clean, dry, and soft.    1350 - Patient educated on importance of not moving right arm due to cath site.    1408 - POC update given to friend, Анна over the phone. All questions answered appropriately.    1410 - Patient tolerating PO intake, denies nausea.     1417 - EKG at bedside for post stent placement

## 2025-01-02 NOTE — DISCHARGE INSTRUCTIONS
What to Expect Post Sedation    Rest and take it easy for the first 24 hours.  A responsible adult is recommended to remain with you during that time.  It is normal to feel sleepy.  We encourage you to not do anything that requires balance, judgment or coordination.    FOR 24 HOURS DO NOT:  Drive, operate machinery or run household appliances.  Drink beer or alcoholic beverages.  Make important decisions or sign legal documents.    To avoid nausea, slowly advance diet as tolerated, avoiding spicy or greasy foods for the first day.  Add more substantial food to your diet according to your provider's instructions.  Babies can be fed formula or breast milk as soon as they are hungry.  INCREASE FLUIDS AND FIBER TO AVOID CONSTIPATION.    MILD FLU-LIKE SYMPTOMS ARE NORMAL.  YOU MAY EXPERIENCE GENERALIZED MUSCLE ACHES, THROAT IRRITATION, HEADACHE AND/OR SOME NAUSEA.  If any questions arise, call your provider.  If your provider is not available, please feel free to call the [facility] at [phone].    MEDICATIONS: Resume taking daily medication.  Take prescribed pain medication with food.  If no medication is prescribed, you may take non-aspirin pain medication if needed.  PAIN MEDICATION CAN BE VERY CONSTIPATING.  Take a stool softener or laxative such as senokot, pericolace, or milk of magnesia if needed.      Diet    Resume your normal diet as tolerated.  A diet low in cholesterol, fat, and sodium is recommended for good health.     Discharge Instructions:  POST ANGIOGRAM  General Care Instructions  Maintain a bandage over the incision site for 24 hours.  It's normal to find a small bruise or dime-sized lump at the insertion site. This should disappear within a few weeks.  Do not apply lotions or powders to the site.  Do not immerse the catheter insertion site in water (bathtub/swimming) for five days. It is ok to shower 24 hours after the procedure.  You may resume your normal diet immediately; on the day of  your procedure, drink 6-10 glasses of water to help flush the contrast liquid out of your system.  If the doctor inserted the catheter in your arm:  For 3 days, DO NOT lift anything heavier than 10 pounds (approximately a gallon of milk). DO NOT do any heavy pushing, pulling, or twisting.    Medications  If your current medications need to be changed, you will be provided with an updated list of your medications prior to discharge.  If you take warfarin (Coumadin), resume taking your usual dose the evening after the procedure.  DO NOT STOP taking prescribed blood thinning (anti-platelet) medications unless instructed by your cardiologist.  These medications include:  Aspirin, Clopidogrel (Plavix), Ticagrelor (Brilinta), or Prasugrel (Effient)   If you take one of the following anticoagulants, RESUME 24 HOURS after your procedure:  Apixiban (Eliquis), Rivaroxaban (Xarelto), Dabigatran (Pradaxa), Edoxaban (Savaysa)  If you take metformin (Glucophage), RESUME 48 HOURS after your procedure.    When to call your healthcare provider  Call your cardiologist right away at [phone] if you have any of the following:   Problems/Concerns taking any of your prescribed heart medicines.   The insertion site has increasing pain, swelling, redness, bleeding, or drainage.   Your arm or leg below where the insertion site changes color, is cool, or is numb.   You have chest pain or shortness of breath that does not go away with rest.   Your pulse feels irregular -- very slow (less than 60 beats/minute) or very fast (over 100 beats/minute).   You have dizziness, fainting, or you are very tired.   You are coughing up blood or yellow or green mucus.   You have chills or a fever over 101°F (38.3°C).    If there is bleeding at the catheter insertion site, apply pressure for 10 minutes.  If bleeding persists, call [phone], and continue to hold pressure until advanced medical support arrives.        Exercising Safely After Percutaneous  Coronary Intervention (PCI)  After percutaneous coronary intervention (PCI), which involves angioplasty and often stenting, it's important to focus on your heart health. Exercise can help strengthen your heart. It can also help you feel good and improve your overall health. Talk with your health care provider or cardiac rehab team member about good options for you.  Start slowly. Work up to more vigorous exercise as you get stronger. Aim for at least 150 minutes of exercise each week.  Include aerobic activities. These make the heart beat faster. They work the heart and lungs, and improve the body's ability to use oxygen. Good choices include walking, swimming, and biking .  Always follow your doctor's recommendation for exercise.   You have been referred to cardiac rehabilitation, which is important for your recovery.  You may contact [facility]'s Intensive Cardiac Rehab Program at [phone] to learn more and schedule a visit.        Lifestyle Management After Percutaneous Coronary Intervention (PCI)  Percutaneous coronary intervention (PCI)  involves angioplasty and often stenting. This procedure can open arteries and relieve symptoms. But, it doesn't cure coronary artery disease. New blockages can still form. You need to take steps to prevent this by managing risk factors. Doing so will help make your heart and arteries healthier. Your doctor may prescribe cardiac rehabilitation to help with this lifelong process.  Understanding risk factors  Some risk factors for coronary artery disease can be controlled. These include smoking, high blood pressure, cholesterol, diabetes, and obesity. They can be managed with medication, diet, and exercise. Support and counseling can also play a role. The effort will pay off! Managing risk factors can help you be more active, feel better, and reduce the risk of heart attack.    If you smoke, quit!  If your doctor has been urging you to quit smoking, it's for good reasons. Smoking  damages your heart, blood vessels, and lungs. The good news is that quitting can halt or even reverse the damage of smoking. To quit now:  Get medical help. Ask your doctor for advice on stop-smoking programs. Also ask about medications or nicotine replacement therapy products that may help you quit smoking.  Get support. Join a support group. Ask for help from your family and friends.  Don't give up. It often takes several tries to succeed in quitting smoking.  Avoid secondhand smoke. Ask family and friends not to smoke around you.    DRESSING: Keep dressing and incision dry and intact for 24 hours, may remove dressing and shower after 4 pm on 1/3, do not need to replace.  Do not submerge site in water for 7 days.     BOWEL FUNCTION:  If you are having problems, use what you normally would or call your provider for suggestions. It also helps to stay regular by including fiber in your diet (for example: bran or fruits and vegetables) and drink plenty of liquids (water, juice, etc.).

## 2025-01-02 NOTE — OR NURSING
1415 assumed care of patient.  Report from RN.  Patient is awake.  TR band to right wrist is oozy, 2 ml injected into TR band, patient denies numbness or tingling to right hand.  Educated on wrist precautions.  Patient updated on plan of care.    1515 2 ml air removed from TR band, no bleeding to site.     1535 2 ml air removed from TR band, no bleeding to site.     1555 2 ml air removed from TR band, no bleeding to site.     1615 3 ml air removed from TR band, no bleeding to site.     1623 patient up to bathroom steady on feet.     1635 3 ml air removed from TR band, no bleeding to site.     1700 TR band removed, no bleeding to site, gauze and tegaderm dressing applied.     1745 Patient up to edge of bed, denies discomfort.  Access site CDI soft no bleeding.  All lines and monitors discontinued. Reviewed discharge paperwork with pt and friend. Discussed diet, activity, medications, follow up care and worsening symptoms. No questions at this time.     1755 Pt discharged home with home via wheelchair by RN.

## 2025-01-06 ENCOUNTER — TELEPHONE (OUTPATIENT)
Dept: HEALTH INFORMATION MANAGEMENT | Facility: OTHER | Age: 75
End: 2025-01-06
Payer: MEDICARE

## 2025-01-13 ENCOUNTER — TELEPHONE (OUTPATIENT)
Dept: HEALTH INFORMATION MANAGEMENT | Facility: OTHER | Age: 75
End: 2025-01-13
Payer: MEDICARE

## 2025-01-20 ENCOUNTER — OFFICE VISIT (OUTPATIENT)
Dept: CARDIOLOGY | Facility: MEDICAL CENTER | Age: 75
End: 2025-01-20
Attending: NURSE PRACTITIONER
Payer: MEDICARE

## 2025-01-20 VITALS
BODY MASS INDEX: 22.48 KG/M2 | SYSTOLIC BLOOD PRESSURE: 108 MMHG | OXYGEN SATURATION: 97 % | HEIGHT: 72 IN | HEART RATE: 86 BPM | WEIGHT: 166 LBS | RESPIRATION RATE: 16 BRPM | DIASTOLIC BLOOD PRESSURE: 64 MMHG

## 2025-01-20 DIAGNOSIS — E11.9 TYPE 2 DIABETES MELLITUS WITHOUT COMPLICATION, WITHOUT LONG-TERM CURRENT USE OF INSULIN (HCC): ICD-10-CM

## 2025-01-20 DIAGNOSIS — I10 PRIMARY HYPERTENSION: ICD-10-CM

## 2025-01-20 DIAGNOSIS — E78.2 MIXED HYPERLIPIDEMIA: ICD-10-CM

## 2025-01-20 DIAGNOSIS — I25.10 CORONARY ARTERY DISEASE DUE TO LIPID RICH PLAQUE: ICD-10-CM

## 2025-01-20 DIAGNOSIS — I21.11 ST ELEVATION MYOCARDIAL INFARCTION INVOLVING RIGHT CORONARY ARTERY (HCC): ICD-10-CM

## 2025-01-20 DIAGNOSIS — N17.9 AKI (ACUTE KIDNEY INJURY) (HCC): ICD-10-CM

## 2025-01-20 DIAGNOSIS — I25.83 CORONARY ARTERY DISEASE DUE TO LIPID RICH PLAQUE: ICD-10-CM

## 2025-01-20 PROCEDURE — 99212 OFFICE O/P EST SF 10 MIN: CPT | Performed by: NURSE PRACTITIONER

## 2025-01-20 PROCEDURE — 3078F DIAST BP <80 MM HG: CPT | Performed by: NURSE PRACTITIONER

## 2025-01-20 PROCEDURE — 99214 OFFICE O/P EST MOD 30 MIN: CPT | Performed by: NURSE PRACTITIONER

## 2025-01-20 PROCEDURE — 3074F SYST BP LT 130 MM HG: CPT | Performed by: NURSE PRACTITIONER

## 2025-01-20 ASSESSMENT — ENCOUNTER SYMPTOMS
SHORTNESS OF BREATH: 0
PND: 0
LOSS OF CONSCIOUSNESS: 0
CHILLS: 0
DIZZINESS: 0
TREMORS: 1
INSOMNIA: 0
FEVER: 0
MYALGIAS: 0
ORTHOPNEA: 0
NAUSEA: 0
ABDOMINAL PAIN: 0
HEADACHES: 0
BRUISES/BLEEDS EASILY: 0
PALPITATIONS: 0
COUGH: 0

## 2025-01-20 ASSESSMENT — FIBROSIS 4 INDEX: FIB4 SCORE: 1.43

## 2025-01-20 NOTE — PROGRESS NOTES
Chief Complaint   Patient presents with    Hospital Follow-up     STEMI in 2024, treated with PCI/AMARA to the RCA; stage PCI/AMARA to the LCx in 2025.    Coronary Artery Disease    HTN (Controlled)    Hyperlipidemia    Diabetes (Controlled)       Subjective     Rancho He is a 74 y.o. male who presents today for hospital follow-up of STEMI.    Rancho is a 74 year old male with history of HTN, hyperlipidemia, and DM, who presented to [facility] on 2024 with chest pain, transferred from outside facility. He did have elevated troponins and EKG changes, and was taken to the Cath lab, with [facility] revealed 99% stenosis of RCA, treated with PCI/AMARA. There was also 70% stenosis mid LAD and 70-80% stenosis of the LCX, that was to be managed with staged PCI, due to LAUREL. Medical therapy was adjusted with addition of ASA, Plavix and high intensity statin.    On 2025, he had staged PCI, with AMARA to the LCx. The LAD was improved at 40-50%.    He is here today for hospital follow-up. He is feeling much better, and notices that his LE edema is also improved. He denies any further chest pain, pressure, tightness or discomfort; no palpitations; no shortness of breath, orthopnea or PND; no dizziness or syncope. BP is stable. He is compliant with his medications.    Cardiovascular Risk Factors:  1. Smoking status: former smoker (quit in his 50s)  2. Type II Diabetes Mellitus: yes  3. Hypertension: yes  4. Dyslipidemia: yes  5. Family history of early Coronary Artery Disease in a first degree relative (Male less than 55 years of age; Female less than 65 years of age): yes (father  of MI)  6.  Obesity and/or Metabolic Syndrome: BMI 22.51  7. Sedentary lifestyle: no     Past Medical History:   Diagnosis Date    CAD (coronary artery disease) 2024    STEMI. PCI/AMARA (Synergy 3.25 x 48mm) the proximal/mid RCA. 2025: PCI/AMARA (Synergy 4.5 x 16mm) to the proximal LCx.    Diabetes (HCC)     Hyperlipidemia      Hypertension     Myocardial infarct (HCC) 11/2024    Renal disorder     Hx of acute kidney injury     Past Surgical History:   Procedure Laterality Date    CARDIAC CATH, RIGHT/LEFT HEART      11/2024     History reviewed. No pertinent family history.  Social History     Socioeconomic History    Marital status: Single     Spouse name: Not on file    Number of children: Not on file    Years of education: Not on file    Highest education level: Not on file   Occupational History    Not on file   Tobacco Use    Smoking status: Former     Types: Cigarettes    Smokeless tobacco: Current     Types: Chew   Vaping Use    Vaping status: Never Used   Substance and Sexual Activity    Alcohol use: Never    Drug use: Never    Sexual activity: Not on file   Other Topics Concern    Not on file   Social History Narrative    Not on file     Social Drivers of Health     Financial Resource Strain: Not on file   Food Insecurity: Food Insecurity Present (11/16/2024)    Hunger Vital Sign     Worried About Running Out of Food in the Last Year: Sometimes true     Ran Out of Food in the Last Year: Never true   Transportation Needs: No Transportation Needs (11/16/2024)    PRAPARE - Transportation     Lack of Transportation (Medical): No     Lack of Transportation (Non-Medical): No   Physical Activity: Not on file   Stress: Not on file   Social Connections: Not on file   Intimate Partner Violence: Not At Risk (11/16/2024)    Humiliation, Afraid, Rape, and Kick questionnaire     Fear of Current or Ex-Partner: No     Emotionally Abused: No     Physically Abused: No     Sexually Abused: No   Housing Stability: Low Risk  (11/16/2024)    Housing Stability Vital Sign     Unable to Pay for Housing in the Last Year: No     Number of Times Moved in the Last Year: 0     Homeless in the Last Year: No     No Known Allergies  Outpatient Encounter Medications as of 1/20/2025   Medication Sig Dispense Refill    Multiple Vitamins-Minerals (CENTRUM SILVER  50+MEN) Tab Take  by mouth every day.      metFORMIN (GLUCOPHAGE) 500 MG Tab Take 500 mg by mouth 2 times a day.      aspirin 81 MG EC tablet Take 1 Tablet by mouth every day. 200 Tablet 3    atorvastatin (LIPITOR) 80 MG tablet Take 1 Tablet by mouth every evening. 90 Tablet 4    clopidogrel (PLAVIX) 75 MG Tab Take 1 Tablet by mouth every day. 90 Tablet 4    pioglitazone (ACTOS) 30 MG Tab Take 30 mg by mouth every day.      losartan-hydrochlorothiazide (HYZAAR) 100-12.5 MG per tablet Take 1 Tablet by mouth every day.      metoprolol SR (TOPROL XL) 25 MG TABLET SR 24 HR Take 1 Tablet by mouth every day. 90 Tablet 2    omeprazole (PRILOSEC) 20 MG delayed-release capsule Take 1 Capsule by mouth every day. 90 Capsule 3     No facility-administered encounter medications on file as of 1/20/2025.     Review of Systems   Constitutional:  Negative for chills and fever.   HENT:  Negative for congestion.    Respiratory:  Negative for cough and shortness of breath.    Cardiovascular:  Negative for chest pain, palpitations, orthopnea, leg swelling and PND.   Gastrointestinal:  Negative for abdominal pain and nausea.   Musculoskeletal:  Negative for myalgias.   Skin:  Negative for rash.   Neurological:  Positive for tremors. Negative for dizziness, loss of consciousness and headaches.   Endo/Heme/Allergies:  Does not bruise/bleed easily.   Psychiatric/Behavioral:  The patient does not have insomnia.               Objective     /64 (BP Location: Left arm, Patient Position: Sitting, BP Cuff Size: Adult)   Pulse 86   Resp 16   Ht 1.829 m (6')   Wt 75.3 kg (166 lb)   SpO2 97%   BMI 22.51 kg/m²     Physical Exam  Constitutional:       Appearance: He is well-developed.   HENT:      Head: Normocephalic.   Neck:      Vascular: No JVD.   Cardiovascular:      Rate and Rhythm: Normal rate and regular rhythm.      Heart sounds: Normal heart sounds.   Pulmonary:      Effort: Pulmonary effort is normal. No respiratory distress.       Breath sounds: Normal breath sounds. No wheezing or rales.   Abdominal:      General: Bowel sounds are normal. There is no distension.      Palpations: Abdomen is soft.      Tenderness: There is no abdominal tenderness.   Musculoskeletal:         General: Normal range of motion.      Cervical back: Normal range of motion and neck supple.   Skin:     General: Skin is warm and dry.      Findings: No rash.   Neurological:      Mental Status: He is alert and oriented to person, place, and time.   Psychiatric:         Mood and Affect: Mood normal.         Behavior: Behavior normal.       Procedures of [facility] of 1/2/2025:  Coronary arteriograms  Left heart catheterization  IVUS guided Angioplasty and placement of a 4.5 by 16mm Synergy drug-eluting stent in proximal  left circumflex coronary artery.     POSTOPERATIVE DIAGNOSIS OF [facility] OF 11/16/2024:  99% thrombotic culprit proximal and mid RCA stenosis, JUNIOR I flow  70% focal mid LAD stenosis  70 to 80% focal proximal LCx stenosis  LVEF 75%, LVEDP 12 mmHg  Successful emergency PCI culprit proximal and mid RCA (3.25 x 48 mm Synergy AMARA), excellent result    CONCLUSIONS OF [facility] OF 11/17/2024:  Mildly challenging study.   Normal left and right ventricular systolic function.  Visually   estimated LVEF of 55%.  Mild basal inferior wall hypokinesis.   No significant valvular stenosis or regurgitation.   When compared to prior echo of 5/10/22, mild basal inferior wall hypokinesis now present.     Lab Results   Component Value Date/Time    CHOLSTRLTOT 135 11/17/2024 05:17 AM    LDL 80 11/17/2024 05:17 AM    HDL 40 11/17/2024 05:17 AM    TRIGLYCERIDE 75 11/17/2024 05:17 AM        Lab Results   Component Value Date/Time    ASTSGOT 23 01/02/2025 08:45 AM    ALTSGPT 13 01/02/2025 08:45 AM       Lab Results   Component Value Date/Time    SODIUM 141 01/02/2025 08:45 AM    POTASSIUM 3.9 01/02/2025 08:45 AM    CHLORIDE 105 01/02/2025 08:45 AM    CO2 24 01/02/2025 08:45 AM    GLUCOSE 136 (H)  01/02/2025 08:45 AM    BUN 10 01/02/2025 08:45 AM    CREATININE 1.02 01/02/2025 08:45 AM        Assessment & Plan     1. ST elevation myocardial infarction involving right coronary artery (HCC)        2. Coronary artery disease due to lipid rich plaque  Comp Metabolic Panel    Lipid Profile      3. Primary hypertension        4. Mixed hyperlipidemia  Comp Metabolic Panel    Lipid Profile      5. Type 2 diabetes mellitus without complication, without long-term current use of insulin (HCC)        6. LAUREL (acute kidney injury) (HCC)            Medical Decision Making: Today's Assessment/Status/Plan:        STEMI in November 2024, treated with PCI/AMARA to the RCA, and stage PCI with AMARA to the LCx in January 2025. No further angina or shortness of breath. He lives in [place], and does not have access to cardiac rehab. Continue:  ASA 81mg once daily  Lipitor 80mg once daily  Plavix 75mg once daily (x 12 months)  Toprol XL 25mg once daily  Hyzaar 100/12.5mg once daily    2. Hypertension, treated with Hyzaar and Toprol XL, stable. BP is well controlled today.    3. Hyperlipidemia, treated with Lipitor 80mg. To repeat fasting CMP and lipid panel; LDL goal is <70, ideally closer to 50.    4. Diabetes mellitus, treated with Metformin and Actos. Consider addition or change to Jardiance or Farxiga for cardiac benefits. Followed by PCP.    5. LAUREL, improved at last hospitalization. To repeat CMP.    As above, same medications.  Plavix for 12 months post stent; stressed importance of medication compliance.  Repeat fasting CMP and lipid panel.    Follow-up with me in 3 months in [facility].

## 2025-03-06 LAB
CHOLEST SERPL-MCNC: 106 MG/DL
HDLC SERPL-MCNC: 34 MG/DL
LDLC SERPL CALC-MCNC: 63 MG/DL
TRIGL SERPL-MCNC: 81 MG/DL

## 2025-03-12 ENCOUNTER — TELEMEDICINE2 (OUTPATIENT)
Dept: NEPHROLOGY | Facility: MEDICAL CENTER | Age: 75
End: 2025-03-12
Payer: MEDICARE

## 2025-03-12 VITALS
HEART RATE: 72 BPM | DIASTOLIC BLOOD PRESSURE: 84 MMHG | TEMPERATURE: 98.1 F | SYSTOLIC BLOOD PRESSURE: 142 MMHG | WEIGHT: 158 LBS | BODY MASS INDEX: 21.4 KG/M2 | OXYGEN SATURATION: 97 % | HEIGHT: 72 IN

## 2025-03-12 DIAGNOSIS — I10 PRIMARY HYPERTENSION: ICD-10-CM

## 2025-03-12 DIAGNOSIS — N17.9 AKI (ACUTE KIDNEY INJURY) (HCC): ICD-10-CM

## 2025-03-12 DIAGNOSIS — E11.9 TYPE 2 DIABETES MELLITUS WITHOUT COMPLICATION, WITHOUT LONG-TERM CURRENT USE OF INSULIN (HCC): ICD-10-CM

## 2025-03-12 PROCEDURE — G2211 COMPLEX E/M VISIT ADD ON: HCPCS | Mod: 95 | Performed by: INTERNAL MEDICINE

## 2025-03-12 PROCEDURE — 3077F SYST BP >= 140 MM HG: CPT | Mod: 95 | Performed by: INTERNAL MEDICINE

## 2025-03-12 PROCEDURE — 99214 OFFICE O/P EST MOD 30 MIN: CPT | Mod: 95 | Performed by: INTERNAL MEDICINE

## 2025-03-12 PROCEDURE — 3079F DIAST BP 80-89 MM HG: CPT | Mod: 95 | Performed by: INTERNAL MEDICINE

## 2025-03-12 ASSESSMENT — ENCOUNTER SYMPTOMS
NAUSEA: 0
FEVER: 0
SHORTNESS OF BREATH: 0
COUGH: 0
CHILLS: 0
VOMITING: 0

## 2025-03-12 ASSESSMENT — FIBROSIS 4 INDEX: FIB4 SCORE: 1.43

## 2025-03-12 NOTE — PROGRESS NOTES
Telemedicine: Established Patient   This evaluation was conducted via Teams using secure and encrypted videoconferencing technology. The patient was physically located at [facility] in [place], [place]. The patient was presented by a medical professional at the originating site.   The patient's identity was confirmed and verbal consent was obtained for this telemedicine encounter.    Subjective:   CC:   Chief Complaint   Patient presents with    Chronic Kidney Disease    Hypertension       Rancho He is a 74 y.o. male presenting for evaluation and management of:LAUREL  Patient had LAUREL secondary to cardiorenal syndrome, kidney function has improved, patient has no hematuria or dysuria.  He has a history of hypertension, blood pressure has been fluctuating, patient has no chest pain or shortness of breath  No lower extremity edema.    Review of Systems   Constitutional:  Negative for chills, fever and malaise/fatigue.   Respiratory:  Negative for cough and shortness of breath.    Cardiovascular:  Negative for chest pain and leg swelling.   Gastrointestinal:  Negative for nausea and vomiting.   Genitourinary:  Negative for dysuria, frequency and urgency.         No Known Allergies    Current medicines (including changes today)  Current Outpatient Medications   Medication Sig Dispense Refill    pioglitazone (ACTOS) 30 MG Tab Take 30 mg by mouth every day.      Multiple Vitamins-Minerals (CENTRUM SILVER 50+MEN) Tab Take  by mouth every day.      metFORMIN (GLUCOPHAGE) 500 MG Tab Take 500 mg by mouth 2 times a day.      losartan-hydrochlorothiazide (HYZAAR) 100-12.5 MG per tablet Take 1 Tablet by mouth every day.      aspirin 81 MG EC tablet Take 1 Tablet by mouth every day. 200 Tablet 3    atorvastatin (LIPITOR) 80 MG tablet Take 1 Tablet by mouth every evening. 90 Tablet 4    clopidogrel (PLAVIX) 75 MG Tab Take 1 Tablet by mouth every day. 90 Tablet 4    metoprolol SR (TOPROL XL) 25 MG TABLET SR 24 HR  Take 1 Tablet by mouth every day. 90 Tablet 2    omeprazole (PRILOSEC) 20 MG delayed-release capsule Take 1 Capsule by mouth every day. 90 Capsule 3     No current facility-administered medications for this visit.       Patient Active Problem List    Diagnosis Date Noted    Mixed hyperlipidemia 01/20/2025    Normocytic normochromic anemia 11/17/2024    Bandemia 11/17/2024    LAUREL (acute kidney injury) ([facility]) 11/17/2024    Coronary artery disease 11/17/2024    ST elevation myocardial infarction involving right coronary artery ([facility]) 11/16/2024    Primary hypertension 11/16/2024    Type 2 diabetes mellitus (HCC) 11/16/2024    Tremor 11/16/2024       Family History   Problem Relation Age of Onset    Heart Disease Father        He  has a past medical history of CAD (coronary artery disease) (11/2024), Diabetes ([facility]), Hyperlipidemia, Hypertension, Myocardial infarct ([facility]) (11/2024), and Renal disorder.  He  has a past surgical history that includes cardiac cath, right/left heart.       Objective:   BP (!) 142/84 (BP Location: Right arm, Patient Position: Sitting, BP Cuff Size: Adult)   Pulse 72   Temp 36.7 °C (98.1 °F) (Temporal)   Ht 1.829 m (6')   Wt 71.7 kg (158 lb)   SpO2 97%   BMI 21.43 kg/m²     Physical Exam  Constitutional:       General: He is not in acute distress.     Appearance: Normal appearance. He is not ill-appearing.   HENT:      Head: Normocephalic and atraumatic.      Nose: Nose normal.   Eyes:      General: No scleral icterus.     Conjunctiva/sclera: Conjunctivae normal.   Pulmonary:      Effort: Pulmonary effort is normal.   Skin:     General: Skin is warm.      Coloration: Skin is not jaundiced.   Neurological:      General: No focal deficit present.      Mental Status: He is alert and oriented to person, place, and time. Mental status is at baseline.   Psychiatric:         Mood and Affect: Mood normal.         Thought Content: Thought content normal.     Recent lab from March 10, 2025 were  reviewed showed creatinine 1.0 mg/dL    Assessment and Plan:   The following treatment plan was discussed:     1. LAUREL (acute kidney injury) (HCC)  Secondary to cardiorenal syndrome  LAUREL resolved  No uremic symptoms  Renal dose of medication  Avoid nephrotoxins  Continue same medication regimen  Patient was advised to call us if symptoms worsen    - Basic Metabolic Panel; Future  - CBC WITHOUT DIFFERENTIAL; Future  - MICROALB/CREAT RATIO RAND. UR    2. Primary hypertension  Uncontrolled  Patient was advised to be on low-sodium diet  Consider increasing the dose of metoprolol if blood pressure continue to be elevated  Patient to follow-up with his cardiologist soon  - Basic Metabolic Panel; Future  - CBC WITHOUT DIFFERENTIAL; Future  - MICROALB/CREAT RATIO RAND. UR    3. Type 2 diabetes mellitus without complication, without long-term current use of insulin ([facility])    Continue to optimize diabetes control for hemoglobin A1c below 7%- Basic Metabolic Panel; Future  - CBC WITHOUT DIFFERENTIAL; Future  - MICROALB/CREAT RATIO RAND. UR        Follow-up: Return in about 1 year (around 3/12/2026), or if symptoms worsen or fail to improve.

## 2025-04-23 ENCOUNTER — APPOINTMENT (OUTPATIENT)
Dept: CARDIOLOGY | Facility: PHYSICIAN GROUP | Age: 75
End: 2025-04-23
Payer: MEDICARE

## 2025-04-30 ENCOUNTER — OFFICE VISIT (OUTPATIENT)
Dept: CARDIOLOGY | Facility: PHYSICIAN GROUP | Age: 75
End: 2025-04-30
Payer: MEDICARE

## 2025-04-30 VITALS
DIASTOLIC BLOOD PRESSURE: 60 MMHG | SYSTOLIC BLOOD PRESSURE: 100 MMHG | BODY MASS INDEX: 22.04 KG/M2 | WEIGHT: 162.7 LBS | RESPIRATION RATE: 14 BRPM | HEIGHT: 72 IN | HEART RATE: 83 BPM | OXYGEN SATURATION: 94 %

## 2025-04-30 DIAGNOSIS — I10 PRIMARY HYPERTENSION: ICD-10-CM

## 2025-04-30 DIAGNOSIS — I25.10 CORONARY ARTERY DISEASE DUE TO LIPID RICH PLAQUE: ICD-10-CM

## 2025-04-30 DIAGNOSIS — I21.11 ST ELEVATION MYOCARDIAL INFARCTION INVOLVING RIGHT CORONARY ARTERY (HCC): ICD-10-CM

## 2025-04-30 DIAGNOSIS — N17.9 AKI (ACUTE KIDNEY INJURY) (HCC): ICD-10-CM

## 2025-04-30 DIAGNOSIS — I25.83 CORONARY ARTERY DISEASE DUE TO LIPID RICH PLAQUE: ICD-10-CM

## 2025-04-30 DIAGNOSIS — E11.9 TYPE 2 DIABETES MELLITUS WITHOUT COMPLICATION, WITHOUT LONG-TERM CURRENT USE OF INSULIN (HCC): ICD-10-CM

## 2025-04-30 DIAGNOSIS — E78.2 MIXED HYPERLIPIDEMIA: ICD-10-CM

## 2025-04-30 RX ORDER — ATORVASTATIN CALCIUM 80 MG/1
80 TABLET, FILM COATED ORAL EVERY EVENING
Qty: 100 TABLET | Refills: 3 | Status: SHIPPED | OUTPATIENT
Start: 2025-04-30

## 2025-04-30 RX ORDER — CLOPIDOGREL BISULFATE 75 MG/1
75 TABLET ORAL DAILY
Qty: 100 TABLET | Refills: 2 | Status: SHIPPED | OUTPATIENT
Start: 2025-04-30

## 2025-04-30 RX ORDER — METOPROLOL SUCCINATE 25 MG/1
25 TABLET, EXTENDED RELEASE ORAL DAILY
Qty: 100 TABLET | Refills: 3 | Status: SHIPPED | OUTPATIENT
Start: 2025-04-30

## 2025-04-30 RX ORDER — LOSARTAN POTASSIUM AND HYDROCHLOROTHIAZIDE 12.5; 1 MG/1; MG/1
1 TABLET ORAL DAILY
Qty: 100 TABLET | Refills: 3 | Status: SHIPPED | OUTPATIENT
Start: 2025-04-30

## 2025-04-30 ASSESSMENT — ENCOUNTER SYMPTOMS
ABDOMINAL PAIN: 0
LOSS OF CONSCIOUSNESS: 0
CHILLS: 0
FEVER: 0
NAUSEA: 0
HEADACHES: 0
MYALGIAS: 0
ORTHOPNEA: 0
DIZZINESS: 0
TREMORS: 1
BRUISES/BLEEDS EASILY: 0
INSOMNIA: 0
COUGH: 0
PALPITATIONS: 0
PND: 0
SHORTNESS OF BREATH: 0

## 2025-04-30 ASSESSMENT — FIBROSIS 4 INDEX: FIB4 SCORE: 1.45

## 2025-04-30 NOTE — PROGRESS NOTES
Chief Complaint   Patient presents with    Follow-Up    Coronary Artery Disease    HTN (Controlled)    Hyperlipidemia    Diabetes (Controlled)       Subjective     Rancho He is a 75 y.o. male who presents today for three month follow-up of CAD, STEMI in November 2024, HTN, hyperlipidemia, and DM.    Rancho is a 75 year old male with history of CAD, status post PCI/AMARA to the RCA in November 2024, and staged PCI/AMARA to the LCx in January 2025 (LAD at 40-50%), HTN, hyperlipidemia, and DM.    He is here today for three month follow-up. He is doing well and feels good. He has been walking daily, and tolerates well.    He did see his nephrologist via TeleMedicine last month, and renal function is improved; he is to follow-up in 1 year.    He denies any further chest pain, pressure, tightness or discomfort; no palpitations; no shortness of breath, orthopnea or PND; no dizziness or syncope. BP is stable. He is compliant with his medications. He has been trying to cut down on salt.    Past Medical History:   Diagnosis Date    CAD (coronary artery disease) 11/2024    STEMI. PCI/AMARA (Synergy 3.25 x 48mm) the proximal/mid RCA. January 2025: PCI/AMARA (Synergy 4.5 x 16mm) to the proximal LCx.    Diabetes (HCC)     Hyperlipidemia     Hypertension     Myocardial infarct (HCC) 11/2024    Renal disorder     Hx of acute kidney injury     Past Surgical History:   Procedure Laterality Date    CARDIAC CATH, RIGHT/LEFT HEART      11/2024     Family History   Problem Relation Age of Onset    Heart Disease Father      Social History     Socioeconomic History    Marital status: Single     Spouse name: Not on file    Number of children: Not on file    Years of education: Not on file    Highest education level: Not on file   Occupational History    Not on file   Tobacco Use    Smoking status: Former     Types: Cigarettes    Smokeless tobacco: Current     Types: Chew   Vaping Use    Vaping status: Never Used   Substance and Sexual  Activity    Alcohol use: Never    Drug use: Never    Sexual activity: Not on file   Other Topics Concern    Not on file   Social History Narrative    Not on file     Social Drivers of Health     Financial Resource Strain: Not on file   Food Insecurity: Food Insecurity Present (11/16/2024)    Hunger Vital Sign     Worried About Running Out of Food in the Last Year: Sometimes true     Ran Out of Food in the Last Year: Never true   Transportation Needs: No Transportation Needs (11/16/2024)    PRAPARE - Transportation     Lack of Transportation (Medical): No     Lack of Transportation (Non-Medical): No   Physical Activity: Not on file   Stress: Not on file   Social Connections: Not on file   Intimate Partner Violence: Not At Risk (11/16/2024)    Humiliation, Afraid, Rape, and Kick questionnaire     Fear of Current or Ex-Partner: No     Emotionally Abused: No     Physically Abused: No     Sexually Abused: No   Housing Stability: Low Risk  (11/16/2024)    Housing Stability Vital Sign     Unable to Pay for Housing in the Last Year: No     Number of Times Moved in the Last Year: 0     Homeless in the Last Year: No     No Known Allergies  Outpatient Encounter Medications as of 4/30/2025   Medication Sig Dispense Refill    pioglitazone (ACTOS) 30 MG Tab Take 30 mg by mouth every day.      Multiple Vitamins-Minerals (CENTRUM SILVER 50+MEN) Tab Take  by mouth every day.      metFORMIN (GLUCOPHAGE) 500 MG Tab Take 500 mg by mouth 2 times a day.      losartan-hydrochlorothiazide (HYZAAR) 100-12.5 MG per tablet Take 1 Tablet by mouth every day.      aspirin 81 MG EC tablet Take 1 Tablet by mouth every day. 200 Tablet 3    atorvastatin (LIPITOR) 80 MG tablet Take 1 Tablet by mouth every evening. 90 Tablet 4    clopidogrel (PLAVIX) 75 MG Tab Take 1 Tablet by mouth every day. 90 Tablet 4    metoprolol SR (TOPROL XL) 25 MG TABLET SR 24 HR Take 1 Tablet by mouth every day. 90 Tablet 2    omeprazole (PRILOSEC) 20 MG delayed-release  capsule Take 1 Capsule by mouth every day. 90 Capsule 3     No facility-administered encounter medications on file as of 4/30/2025.     Review of Systems   Constitutional:  Negative for chills and fever.   HENT:  Negative for congestion.    Respiratory:  Negative for cough and shortness of breath.    Cardiovascular:  Negative for chest pain, palpitations, orthopnea, leg swelling and PND.   Gastrointestinal:  Negative for abdominal pain and nausea.   Musculoskeletal:  Negative for myalgias.   Skin:  Negative for rash.   Neurological:  Positive for tremors. Negative for dizziness, loss of consciousness and headaches.   Endo/Heme/Allergies:  Does not bruise/bleed easily.   Psychiatric/Behavioral:  The patient does not have insomnia.               Objective     /60 (BP Location: Left arm, Patient Position: Sitting, BP Cuff Size: Adult)   Pulse 83   Resp 14   Ht 1.829 m (6')   Wt 73.8 kg (162 lb 11.2 oz)   SpO2 94%   BMI 22.07 kg/m²     Physical Exam  Constitutional:       Appearance: He is well-developed.   HENT:      Head: Normocephalic.   Neck:      Vascular: No JVD.   Cardiovascular:      Rate and Rhythm: Normal rate and regular rhythm.      Heart sounds: Normal heart sounds.   Pulmonary:      Effort: Pulmonary effort is normal. No respiratory distress.      Breath sounds: Normal breath sounds. No wheezing or rales.   Abdominal:      General: Bowel sounds are normal. There is no distension.      Palpations: Abdomen is soft.      Tenderness: There is no abdominal tenderness.   Musculoskeletal:         General: Normal range of motion.      Cervical back: Normal range of motion and neck supple.   Skin:     General: Skin is warm and dry.      Findings: No rash.   Neurological:      Mental Status: He is alert and oriented to person, place, and time.   Psychiatric:         Mood and Affect: Mood normal.         Behavior: Behavior normal.       CORONARY ANGIOGRAPHY:    Procedures of [facility] of 1/2/2025:  Coronary  arteriograms  Left heart catheterization  IVUS guided Angioplasty and placement of a 4.5 by 16mm Synergy drug-eluting stent in proximal  left circumflex coronary artery.    POSTOPERATIVE DIAGNOSIS OF [facility] OF 11/16/2024:  99% thrombotic culprit proximal and mid RCA stenosis, JUNIOR I flow  70% focal mid LAD stenosis  70 to 80% focal proximal LCx stenosis  LVEF 75%, LVEDP 12 mmHg  Successful emergency PCI culprit proximal and mid RCA (3.25 x 48 mm Synergy AMARA), excellent result     ECHOCARDIOGRAPHY:    CONCLUSIONS OF [facility] OF 11/17/2024:  Mildly challenging study.   Normal left and right ventricular systolic function.  Visually   estimated LVEF of 55%.  Mild basal inferior wall hypokinesis.   No significant valvular stenosis or regurgitation.   When compared to prior echo of 5/10/22, mild basal inferior wall hypokinesis now present.     Lab Results   Component Value Date/Time    CHOLSTRLTOT 106 03/06/2025 12:00 AM    LDL 63 03/06/2025 12:00 AM    HDL 34 03/06/2025 12:00 AM    TRIGLYCERIDE 81 03/06/2025 12:00 AM        Labs as of 3/10/2025:  WBC 7.24  RBC 4.39  Hgb 13.1  Hct 41.2  Platelets 366  Glucose 124  BUN 14  Creatinine 1.10  GFR 63.7    Assessment & Plan     1. ST elevation myocardial infarction involving right coronary artery ([facility])        2. Coronary artery disease due to lipid rich plaque        3. Primary hypertension        4. Mixed hyperlipidemia        5. Type 2 diabetes mellitus without complication, without long-term current use of insulin ([facility])        6. LAUREL (acute kidney injury) ([facility])            Medical Decision Making: Today's Assessment/Status/Plan:        STEMI in November 2024, treated with PCI/AMARA to the RCA, and stage PCI with AMARA to the LCx in January 2025. No further angina or shortness of breath. He is walking daily, and tolerating well. Continue:  ASA 81mg once daily  Lipitor 80mg once daily  Plavix 75mg once daily (x 12 months)  Toprol XL 25mg once daily  Hyzaar 100/12.5mg once daily     2.  Hypertension, treated with Hyzaar and Toprol XL, stable. BP is good today (although a little on the lower side).     3. Hyperlipidemia, treated with Lipitor 80mg. LDL is better at 63.     4. Diabetes mellitus, treated with Metformin and Actos. Continue to consider addition or change to Jardiance or Farxiga for cardiac benefits. Followed by PCP.     5. LAUREL, improved at last hospitalization, followed by nephrology. No follow-up for a year, as renal function has improved.     As above, same medications, which are renewed.     Follow-up with me in 8 months in [facility].